# Patient Record
Sex: FEMALE | Race: BLACK OR AFRICAN AMERICAN | Employment: FULL TIME | URBAN - METROPOLITAN AREA
[De-identification: names, ages, dates, MRNs, and addresses within clinical notes are randomized per-mention and may not be internally consistent; named-entity substitution may affect disease eponyms.]

---

## 2021-02-26 PROBLEM — N94.6 DYSMENORRHEA: Status: ACTIVE | Noted: 2021-02-26

## 2021-02-26 PROBLEM — Z12.31 SCREENING MAMMOGRAM, ENCOUNTER FOR: Status: ACTIVE | Noted: 2021-02-26

## 2021-02-26 PROBLEM — Z01.419 WOMEN'S ANNUAL ROUTINE GYNECOLOGICAL EXAMINATION: Status: ACTIVE | Noted: 2021-02-26

## 2021-02-26 PROBLEM — N92.1 MENORRHAGIA WITH IRREGULAR CYCLE: Status: ACTIVE | Noted: 2021-02-26

## 2021-03-01 PROBLEM — D64.9 ANEMIA: Status: ACTIVE | Noted: 2021-03-01

## 2021-03-01 PROBLEM — R79.89 ABNORMAL TSH: Status: ACTIVE | Noted: 2021-03-01

## 2021-03-12 ENCOUNTER — HOSPITAL ENCOUNTER (OUTPATIENT)
Dept: MAMMOGRAPHY | Age: 41
Discharge: HOME | End: 2021-03-12
Attending: OBSTETRICS & GYNECOLOGY
Payer: COMMERCIAL

## 2021-03-12 DIAGNOSIS — Z12.31 SCREENING MAMMOGRAM, ENCOUNTER FOR: ICD-10-CM

## 2021-03-12 PROCEDURE — 77067 SCR MAMMO BI INCL CAD: CPT

## 2021-03-16 ENCOUNTER — HOSPITAL ENCOUNTER (OUTPATIENT)
Dept: MAMMOGRAPHY | Age: 41
Discharge: HOME | End: 2021-03-16
Attending: OBSTETRICS & GYNECOLOGY
Payer: COMMERCIAL

## 2021-03-16 DIAGNOSIS — R92.8 ABNORMAL SCREENING MAMMOGRAM: ICD-10-CM

## 2021-03-16 PROCEDURE — 76642 ULTRASOUND BREAST LIMITED: CPT

## 2021-03-16 PROCEDURE — 77065 DX MAMMO INCL CAD UNI: CPT

## 2021-03-22 PROBLEM — D50.0 IRON DEFICIENCY ANEMIA DUE TO CHRONIC BLOOD LOSS: Status: ACTIVE | Noted: 2021-03-01

## 2021-05-12 ENCOUNTER — HOSPITAL ENCOUNTER (OUTPATIENT)
Dept: SURGERY | Age: 41
Discharge: HOME | End: 2021-05-12

## 2021-05-12 VITALS — HEIGHT: 65 IN | BODY MASS INDEX: 24.99 KG/M2 | WEIGHT: 150 LBS

## 2021-05-13 NOTE — H&P
[address] [facility]  [address], [place] [address], [address]  [address]01 [facility], [place], [place] Singleton, [facility]    [facility] H&P      Assessment/Plan    Problem List  Date Reviewed: 2021          Codes Class    Iron deficiency anemia due to chronic blood loss ICD-10-CM: D50.0  ICD-9-CM: 280.0     Overview Signed 3/22/2021 10:34 AM by Adan Miranda MD     Additional Fe             Abnormal TSH ICD-10-CM: R79.89  ICD-9-CM: 790.6     Overview Signed 3/22/2021 10:34 AM by Adan Miranda MD     noted             Women's annual routine gynecological examination ICD-10-CM: Z01.419  ICD-9-CM: V72.31     Overview Signed 2021 10:51 AM by Adan Miranda MD     Pap + HPV done 21             Menorrhagia with irregular cycle ICD-10-CM: N92.1  ICD-9-CM: 626.2     Overview Addendum 3/22/2021 10:33 AM by Adan Miranda MD     US reviewed today and D/W pt in detail. She has previous failed outpatient medical management desires more definitive treatment at this time    PLAN:  Hysteroscopy D&C, Nara endometrial ablation  D/W pt at length risks/benefits of procedure including but not limited to death, bleeding, infection, perforation and damage to other internal organs. She exhibited full understanding and wishes to proceed. Dysmenorrhea ICD-10-CM: N94.6  ICD-9-CM: 625.3     Overview Signed 2021 10:54 AM by Adan Miranda MD     noted             Screening mammogram, encounter for ICD-10-CM: Z12.31  ICD-9-CM: V76.12     Overview Signed 2021 10:57 AM by Adan Miranda MD     Ordered 21                   Subjective:    Zi Mojica 39 y.o. presents today for surgical evaluation/treatment of the condition noted above. She is without any new complaints or issues.     OB History    Para Term  AB Living   1 1 0 1 0 1   SAB TAB Ectopic Molar Multiple Live Births   0 0 0 0 0 1      # Outcome Date GA Lbr Kye/2nd Weight Sex Delivery Anes PTL Lv   1   32w0d   F Vag-Spont  Y KYLE       Past Medical History:   Diagnosis Date    Abnormal Papanicolaou smear of cervix        Past Surgical History:   Procedure Laterality Date    HX CHOLECYSTECTOMY         Family History   Problem Relation Age of Onset    Ovarian Cancer Paternal Aunt     Ovarian Cancer Paternal Aunt     Ovarian Cancer Paternal Aunt     Ovarian Cancer Paternal Aunt     Breast Cancer Neg Hx     Colon Cancer Neg Hx     Uterine Cancer Neg Hx        Social History     Socioeconomic History    Marital status: SINGLE     Spouse name: Not on file    Number of children: Not on file    Years of education: Not on file    Highest education level: Not on file   Occupational History    Not on file   Social Needs    Financial resource strain: Not on file    Food insecurity     Worry: Not on file     Inability: Not on file    Transportation needs     Medical: Not on file     Non-medical: Not on file   Tobacco Use    Smoking status: Never Smoker    Smokeless tobacco: Never Used    Tobacco comment: Patient ues e-cigarette- pt denies    Substance and Sexual Activity    Alcohol use: Yes     Comment: socially     Drug use: Not Currently    Sexual activity: Yes     Birth control/protection: Condom   Lifestyle    Physical activity     Days per week: Not on file     Minutes per session: Not on file    Stress: Not on file   Relationships    Social connections     Talks on phone: Not on file     Gets together: Not on file     Attends Faith service: Not on file     Active member of club or organization: Not on file     Attends meetings of clubs or organizations: Not on file     Relationship status: Not on file    Intimate partner violence     Fear of current or ex partner: Not on file     Emotionally abused: Not on file     Physically abused: Not on file     Forced sexual activity: Not on file   Other Topics Concern    Not on file   Social History Narrative    Abuse: Feels safe at home, no history of physical abuse, no history of sexual abuse        No Known Allergies      Review of Systems    Constitutional: No fevers or chills     CV: No chest pain or palpatations    Resp: No SOB or cough    GI: No nausea/vomiting/diarrhea/constipation    Neuro: No HA, no seizure like activity    Skin: No rashes or lesions     : No dysuria or hematuria        Objective    Visit Vitals  LMP 05/02/2021         Physical Exam    Gen: alert and cooperative, NAD    HEENT: NCAT    CV: RRR    Resp: CTA bilat    Abd: soft, NT, NABS    EXT: trace edema bilat    Gyn: done as per prev office visit    Neuro: No focal deficits    Skin: No noted rashes or lesions       Vincent Cabral MD  11:47 AM  05/13/21

## 2021-05-18 ENCOUNTER — ANESTHESIA EVENT (OUTPATIENT)
Dept: SURGERY | Age: 41
End: 2021-05-18
Payer: COMMERCIAL

## 2021-05-19 ENCOUNTER — ANESTHESIA (OUTPATIENT)
Dept: SURGERY | Age: 41
End: 2021-05-19
Payer: COMMERCIAL

## 2021-05-19 ENCOUNTER — HOSPITAL ENCOUNTER (OUTPATIENT)
Age: 41
Discharge: HOME | End: 2021-05-19
Attending: OBSTETRICS & GYNECOLOGY | Admitting: OBSTETRICS & GYNECOLOGY
Payer: COMMERCIAL

## 2021-05-19 VITALS
TEMPERATURE: 98.2 F | DIASTOLIC BLOOD PRESSURE: 69 MMHG | WEIGHT: 158 LBS | BODY MASS INDEX: 26.98 KG/M2 | SYSTOLIC BLOOD PRESSURE: 121 MMHG | HEART RATE: 66 BPM | HEIGHT: 64 IN | RESPIRATION RATE: 16 BRPM | OXYGEN SATURATION: 99 %

## 2021-05-19 DIAGNOSIS — N94.6 DYSMENORRHEA: ICD-10-CM

## 2021-05-19 DIAGNOSIS — N92.1 MENORRHAGIA WITH IRREGULAR CYCLE: Primary | ICD-10-CM

## 2021-05-19 LAB — HCG UR QL: NEGATIVE

## 2021-05-19 PROCEDURE — 88305 TISSUE EXAM BY PATHOLOGIST: CPT

## 2021-05-19 PROCEDURE — 74011000250 HC RX REV CODE- 250: Performed by: NURSE ANESTHETIST, CERTIFIED REGISTERED

## 2021-05-19 PROCEDURE — 2709999900 HC NON-CHARGEABLE SUPPLY: Performed by: OBSTETRICS & GYNECOLOGY

## 2021-05-19 PROCEDURE — 77030010509 HC AIRWY LMA MSK TELE -A: Performed by: NURSE ANESTHETIST, CERTIFIED REGISTERED

## 2021-05-19 PROCEDURE — 77030005537 HC CATH URETH BARD -A: Performed by: OBSTETRICS & GYNECOLOGY

## 2021-05-19 PROCEDURE — 81025 URINE PREGNANCY TEST: CPT

## 2021-05-19 PROCEDURE — 77030034928 HC DEV UTER SURSND KT HOLO -G1: Performed by: OBSTETRICS & GYNECOLOGY

## 2021-05-19 PROCEDURE — 76210000020 HC REC RM PH II FIRST 0.5 HR: Performed by: OBSTETRICS & GYNECOLOGY

## 2021-05-19 PROCEDURE — 74011250636 HC RX REV CODE- 250/636: Performed by: NURSE ANESTHETIST, CERTIFIED REGISTERED

## 2021-05-19 PROCEDURE — 76210000006 HC OR PH I REC 0.5 TO 1 HR: Performed by: OBSTETRICS & GYNECOLOGY

## 2021-05-19 PROCEDURE — 58563 HYSTEROSCOPY ABLATION: CPT | Performed by: OBSTETRICS & GYNECOLOGY

## 2021-05-19 PROCEDURE — 77030019927 HC TBNG IRR CYSTO BAXT -A: Performed by: OBSTETRICS & GYNECOLOGY

## 2021-05-19 PROCEDURE — 76060000032 HC ANESTHESIA 0.5 TO 1 HR: Performed by: OBSTETRICS & GYNECOLOGY

## 2021-05-19 PROCEDURE — 76010000138 HC OR TIME 0.5 TO 1 HR: Performed by: OBSTETRICS & GYNECOLOGY

## 2021-05-19 PROCEDURE — 74011250636 HC RX REV CODE- 250/636: Performed by: ANESTHESIOLOGY

## 2021-05-19 PROCEDURE — 74011250637 HC RX REV CODE- 250/637: Performed by: ANESTHESIOLOGY

## 2021-05-19 RX ORDER — ONDANSETRON 2 MG/ML
INJECTION INTRAMUSCULAR; INTRAVENOUS AS NEEDED
Status: DISCONTINUED | OUTPATIENT
Start: 2021-05-19 | End: 2021-05-19 | Stop reason: HOSPADM

## 2021-05-19 RX ORDER — FENTANYL CITRATE 50 UG/ML
INJECTION, SOLUTION INTRAMUSCULAR; INTRAVENOUS AS NEEDED
Status: DISCONTINUED | OUTPATIENT
Start: 2021-05-19 | End: 2021-05-19 | Stop reason: HOSPADM

## 2021-05-19 RX ORDER — LIDOCAINE HYDROCHLORIDE 10 MG/ML
0.1 INJECTION INFILTRATION; PERINEURAL AS NEEDED
Status: DISCONTINUED | OUTPATIENT
Start: 2021-05-19 | End: 2021-05-19 | Stop reason: HOSPADM

## 2021-05-19 RX ORDER — LIDOCAINE HYDROCHLORIDE 20 MG/ML
INJECTION, SOLUTION EPIDURAL; INFILTRATION; INTRACAUDAL; PERINEURAL AS NEEDED
Status: DISCONTINUED | OUTPATIENT
Start: 2021-05-19 | End: 2021-05-19 | Stop reason: HOSPADM

## 2021-05-19 RX ORDER — FENTANYL CITRATE 50 UG/ML
100 INJECTION, SOLUTION INTRAMUSCULAR; INTRAVENOUS ONCE
Status: DISCONTINUED | OUTPATIENT
Start: 2021-05-19 | End: 2021-05-19 | Stop reason: HOSPADM

## 2021-05-19 RX ORDER — MIDAZOLAM HYDROCHLORIDE 1 MG/ML
2 INJECTION, SOLUTION INTRAMUSCULAR; INTRAVENOUS
Status: COMPLETED | OUTPATIENT
Start: 2021-05-19 | End: 2021-05-19

## 2021-05-19 RX ORDER — PROPOFOL 10 MG/ML
INJECTION, EMULSION INTRAVENOUS AS NEEDED
Status: DISCONTINUED | OUTPATIENT
Start: 2021-05-19 | End: 2021-05-19 | Stop reason: HOSPADM

## 2021-05-19 RX ORDER — SODIUM CHLORIDE 0.9 % (FLUSH) 0.9 %
5-40 SYRINGE (ML) INJECTION AS NEEDED
Status: DISCONTINUED | OUTPATIENT
Start: 2021-05-19 | End: 2021-05-19 | Stop reason: HOSPADM

## 2021-05-19 RX ORDER — IBUPROFEN 600 MG/1
600 TABLET ORAL
Qty: 60 TABLET | Refills: 0 | Status: SHIPPED | OUTPATIENT
Start: 2021-05-19

## 2021-05-19 RX ORDER — DEXTROSE, SODIUM CHLORIDE, SODIUM LACTATE, POTASSIUM CHLORIDE, AND CALCIUM CHLORIDE 5; .6; .31; .03; .02 G/100ML; G/100ML; G/100ML; G/100ML; G/100ML
150 INJECTION, SOLUTION INTRAVENOUS CONTINUOUS
Status: DISCONTINUED | OUTPATIENT
Start: 2021-05-19 | End: 2021-05-19 | Stop reason: HOSPADM

## 2021-05-19 RX ORDER — SODIUM CHLORIDE 0.9 % (FLUSH) 0.9 %
5-40 SYRINGE (ML) INJECTION EVERY 8 HOURS
Status: DISCONTINUED | OUTPATIENT
Start: 2021-05-19 | End: 2021-05-19 | Stop reason: HOSPADM

## 2021-05-19 RX ORDER — HYDROCODONE BITARTRATE AND ACETAMINOPHEN 5; 325 MG/1; MG/1
1 TABLET ORAL
Qty: 12 TABLET | Refills: 0 | Status: SHIPPED | OUTPATIENT
Start: 2021-05-19 | End: 2021-05-22

## 2021-05-19 RX ORDER — DEXAMETHASONE SODIUM PHOSPHATE 4 MG/ML
INJECTION, SOLUTION INTRA-ARTICULAR; INTRALESIONAL; INTRAMUSCULAR; INTRAVENOUS; SOFT TISSUE AS NEEDED
Status: DISCONTINUED | OUTPATIENT
Start: 2021-05-19 | End: 2021-05-19 | Stop reason: HOSPADM

## 2021-05-19 RX ORDER — SODIUM CHLORIDE, SODIUM LACTATE, POTASSIUM CHLORIDE, CALCIUM CHLORIDE 600; 310; 30; 20 MG/100ML; MG/100ML; MG/100ML; MG/100ML
75 INJECTION, SOLUTION INTRAVENOUS CONTINUOUS
Status: DISCONTINUED | OUTPATIENT
Start: 2021-05-19 | End: 2021-05-19 | Stop reason: HOSPADM

## 2021-05-19 RX ORDER — SODIUM CHLORIDE, SODIUM LACTATE, POTASSIUM CHLORIDE, CALCIUM CHLORIDE 600; 310; 30; 20 MG/100ML; MG/100ML; MG/100ML; MG/100ML
INJECTION, SOLUTION INTRAVENOUS
Status: DISCONTINUED | OUTPATIENT
Start: 2021-05-19 | End: 2021-05-19 | Stop reason: HOSPADM

## 2021-05-19 RX ORDER — KETOROLAC TROMETHAMINE 30 MG/ML
INJECTION, SOLUTION INTRAMUSCULAR; INTRAVENOUS AS NEEDED
Status: DISCONTINUED | OUTPATIENT
Start: 2021-05-19 | End: 2021-05-19 | Stop reason: HOSPADM

## 2021-05-19 RX ORDER — HYDROMORPHONE HYDROCHLORIDE 2 MG/ML
0.5 INJECTION, SOLUTION INTRAMUSCULAR; INTRAVENOUS; SUBCUTANEOUS
Status: DISCONTINUED | OUTPATIENT
Start: 2021-05-19 | End: 2021-05-19 | Stop reason: HOSPADM

## 2021-05-19 RX ORDER — HYDROCODONE BITARTRATE AND ACETAMINOPHEN 5; 325 MG/1; MG/1
2 TABLET ORAL AS NEEDED
Status: DISCONTINUED | OUTPATIENT
Start: 2021-05-19 | End: 2021-05-19 | Stop reason: HOSPADM

## 2021-05-19 RX ORDER — OXYCODONE HYDROCHLORIDE 5 MG/1
5 TABLET ORAL
Status: DISCONTINUED | OUTPATIENT
Start: 2021-05-19 | End: 2021-05-19 | Stop reason: HOSPADM

## 2021-05-19 RX ADMIN — PROPOFOL 200 MG: 10 INJECTION, EMULSION INTRAVENOUS at 10:10

## 2021-05-19 RX ADMIN — SODIUM CHLORIDE, SODIUM LACTATE, POTASSIUM CHLORIDE, AND CALCIUM CHLORIDE: 600; 310; 30; 20 INJECTION, SOLUTION INTRAVENOUS at 10:05

## 2021-05-19 RX ADMIN — KETOROLAC TROMETHAMINE 30 MG: 30 INJECTION, SOLUTION INTRAMUSCULAR; INTRAVENOUS at 10:14

## 2021-05-19 RX ADMIN — LIDOCAINE HYDROCHLORIDE 100 MG: 20 INJECTION, SOLUTION EPIDURAL; INFILTRATION; INTRACAUDAL; PERINEURAL at 10:10

## 2021-05-19 RX ADMIN — MIDAZOLAM 2 MG: 1 INJECTION INTRAMUSCULAR; INTRAVENOUS at 09:12

## 2021-05-19 RX ADMIN — FENTANYL CITRATE 50 MCG: 50 INJECTION INTRAMUSCULAR; INTRAVENOUS at 10:15

## 2021-05-19 RX ADMIN — ONDANSETRON 4 MG: 2 INJECTION INTRAMUSCULAR; INTRAVENOUS at 10:31

## 2021-05-19 RX ADMIN — SODIUM CHLORIDE, SODIUM LACTATE, POTASSIUM CHLORIDE, AND CALCIUM CHLORIDE 75 ML/HR: 600; 310; 30; 20 INJECTION, SOLUTION INTRAVENOUS at 09:06

## 2021-05-19 RX ADMIN — DEXAMETHASONE SODIUM PHOSPHATE 10 MG: 4 INJECTION, SOLUTION INTRAMUSCULAR; INTRAVENOUS at 10:14

## 2021-05-19 RX ADMIN — HYDROMORPHONE HYDROCHLORIDE 0.5 MG: 2 INJECTION, SOLUTION INTRAMUSCULAR; INTRAVENOUS; SUBCUTANEOUS at 10:47

## 2021-05-19 RX ADMIN — HYDROMORPHONE HYDROCHLORIDE 0.5 MG: 2 INJECTION, SOLUTION INTRAMUSCULAR; INTRAVENOUS; SUBCUTANEOUS at 10:53

## 2021-05-19 RX ADMIN — HYDROCODONE BITARTRATE AND ACETAMINOPHEN 2 TABLET: 5; 325 TABLET ORAL at 11:02

## 2021-05-19 NOTE — DISCHARGE INSTRUCTIONS
Patient Education        Dilation and Curettage: What to Expect at Home  Your Recovery  Dilation and curettage (D&C) is a procedure to remove tissue from the inside of the uterus. The doctor used a curved tool, called a curette, to gently scrape tissue from your uterus. You are likely to have a backache, or cramps similar to menstrual cramps, and pass small clots of blood from your vagina for the first few days. You may have light vaginal bleeding for several weeks after the procedure. You will probably be able to go back to most of your normal activities in 1 or 2 days. This care sheet gives you a general idea about how long it will take for you to recover. But each person recovers at a different pace. Follow the steps below to get better as quickly as possible. How can you care for yourself at home? Activity    · Rest when you feel tired. Getting enough sleep will help you recover.     · Most women are able to return to work the day after the procedure.     · You may have some light vaginal bleeding. Use sanitary pads until you stop bleeding. Using pads makes it easier to monitor your bleeding. Do not rinse your vagina with fluid (douche).   · Ask your doctor when it is okay for you to have sex. Diet    · You can eat your normal diet. If your stomach is upset, try bland, low-fat foods like plain rice, broiled chicken, toast, and yogurt.     · Drink plenty of fluids (unless your doctor tells you not to). Medicines    · Your doctor will tell you if and when you can restart your medicines. You will also get instructions about taking any new medicines.     · If you take aspirin or some other blood thinner, ask your doctor if and when to start taking it again. Make sure that you understand exactly what your doctor wants you to do.     · Be safe with medicines. Take pain medicines exactly as directed. ? If the doctor gave you a prescription medicine for pain, take it as prescribed.   ? If you are not taking a prescription pain medicine, ask your doctor if you can take an over-the-counter medicine.     · If you think your pain medicine is making you sick to your stomach:  ? Take your medicine after meals (unless your doctor has told you not to). ? Ask your doctor for a different pain medicine.     · If your doctor prescribed antibiotics, take them as directed. Do not stop taking them just because you feel better. You need to take the full course of antibiotics. Follow-up care is a key part of your treatment and safety. Be sure to make and go to all appointments, and call your doctor if you are having problems. It's also a good idea to know your test results and keep a list of the medicines you take. When should you call for help? Call 911 anytime you think you may need emergency care. For example, call if:    · You passed out (lost consciousness).     · You have chest pain, are short of breath, or cough up blood. Call your doctor now or seek immediate medical care if:    · You have bright red vaginal bleeding that soaks one or more pads in an hour, or you have large clots.     · You have vaginal discharge that increases in amount or smells bad.     · You are sick to your stomach or cannot drink fluids.     · You have pain that does not get better after you take pain medicine.     · You cannot pass stools or gas.     · You have symptoms of a blood clot in your leg (called a deep vein thrombosis), such as:  ? Pain in your calf, back of the knee, thigh, or groin. ? Redness and swelling in your leg.     · You have signs of infection, such as:  ? Increased pain, swelling, warmth, or redness. ? Red streaks leading from the area. ? Pus draining from the area. ? A fever. Watch closely for changes in your health, and be sure to contact your doctor if you have any problems. Where can you learn more?   Go to http://www."[employer]".com/  Enter D453 in the search box to learn more about \"Dilation and Curettage: What to Expect at Home. \"  Current as of: October 8, 2020               Content Version: 12.8  © 2006-2021 [employer]. Care instructions adapted under license by [employer] (which disclaims liability or warranty for this information). If you have questions about a medical condition or this instruction, always ask your healthcare professional. [address] any warranty or liability for your use of this information.

## 2021-05-19 NOTE — INTERVAL H&P NOTE
I have examined and spoken with the patient this morning. She has no new medical issues or complaints. She reports no new medicines since H&P. She again understands procedure, risks/benefits and agrees to proceed. Emi Land MD 
8:47 AM 
05/19/21

## 2021-05-19 NOTE — ANESTHESIA POSTPROCEDURE EVALUATION
Procedure(s):  DILATATION AND CURETTAGE HYSTEROSCOPY ENDOMETRIAL ABLATION/NOVASURE.     general    Anesthesia Post Evaluation      Multimodal analgesia: multimodal analgesia used between 6 hours prior to anesthesia start to PACU discharge  Patient location during evaluation: PACU  Patient participation: complete - patient participated  Level of consciousness: awake and alert  Pain score: 0  Pain management: adequate  Airway patency: patent  Anesthetic complications: no  Cardiovascular status: acceptable and hemodynamically stable  Respiratory status: acceptable and spontaneous ventilation  Hydration status: acceptable  Post anesthesia nausea and vomiting:  none  Final Post Anesthesia Temperature Assessment:  Normothermia (36.0-37.5 degrees C)      INITIAL Post-op Vital signs:   Vitals Value Taken Time   /67 05/19/21 1105   Temp 36.8 °C (98.2 °F) 05/19/21 1039   Pulse 72 05/19/21 1105   Resp 16 05/19/21 1105   SpO2 98 % 05/19/21 1105

## 2021-05-19 NOTE — OP NOTES
[place] [address], [address]  [address] [facility], [place], Simeon Mensah, [facility]    Geno Browning  1980      Preop Dx:   1. Menorrhagia with irreg cycle    2. Dysmenorrhea    Postop Dx:   Same    Procedure:   1. Hysteroscopy D&C    2. Novasure endometrial ablation    Surgeon:  Kristel Levy      Anesthesia:  LMA general    EBL:  5 mL     IVF:  027 mL       Complications:  None    Findings:  Uniformly thickened EMS. No masses or polyps appreciated. Nml ostia bilaterally. Procedure:  Patient was taken to the operating room where general anesthesia was found to be adequate. She was prepped and draped in the usual sterile fashion and placed in the lithotomy position in [employer] Weighted speculum was placed in patient's vagina and anterior lip of cervix grasped with a single tooth tenaculum. Uterus was sounded to 10 cm. Cervix was sequentially dilated with Hegar dilators to a #6. Hysteroscope was introduced without difficulty and findings as above. Hysteroscope removed and sharp curettage was undertaken until the uterus had a gritty texture throughout. Cervix then dilated to an #8. Novasure introduced and ablation performed without difficulty in usual fashion. Cavity length -- 6.0 cm  Cavity width -- 3.6 cm  Trivedi -- 119  Treatment time -- 1 min, 28 sec  Hysteroscope re-introduced and cautery effect found throughout the endometrial cavity. All instruments removed from the patient's vagina and hemostasis was assured throughout. Patient tolerated procedure well. Sponge, lap and needle counts correct x 3.     Disposition:  Pt to RR in stable condition    Pathology: endometrial curetings      Jana Olivarez MD   10:34 AM  05/19/21

## 2021-06-01 PROBLEM — Z09 POSTOPERATIVE EXAMINATION: Status: ACTIVE | Noted: 2021-06-01

## 2021-08-11 ENCOUNTER — HOSPITAL ENCOUNTER (EMERGENCY)
Age: 41
Discharge: HOME | End: 2021-08-11
Attending: EMERGENCY MEDICINE
Payer: COMMERCIAL

## 2021-08-11 VITALS
BODY MASS INDEX: 26.63 KG/M2 | DIASTOLIC BLOOD PRESSURE: 65 MMHG | TEMPERATURE: 98.4 F | HEART RATE: 80 BPM | RESPIRATION RATE: 17 BRPM | SYSTOLIC BLOOD PRESSURE: 108 MMHG | WEIGHT: 160 LBS | OXYGEN SATURATION: 100 %

## 2021-08-11 DIAGNOSIS — J02.9 SORE THROAT: Primary | ICD-10-CM

## 2021-08-11 LAB — STREP,MOLECULAR STRPM: NOT DETECTED

## 2021-08-11 PROCEDURE — 99283 EMERGENCY DEPT VISIT LOW MDM: CPT

## 2021-08-11 PROCEDURE — 74011000250 HC RX REV CODE- 250: Performed by: EMERGENCY MEDICINE

## 2021-08-11 PROCEDURE — 74011250637 HC RX REV CODE- 250/637: Performed by: EMERGENCY MEDICINE

## 2021-08-11 PROCEDURE — 87651 STREP A DNA AMP PROBE: CPT

## 2021-08-11 RX ORDER — DEXAMETHASONE SODIUM PHOSPHATE 100 MG/10ML
10 INJECTION INTRAMUSCULAR; INTRAVENOUS
Status: COMPLETED | OUTPATIENT
Start: 2021-08-11 | End: 2021-08-11

## 2021-08-11 RX ORDER — LIDOCAINE HYDROCHLORIDE 20 MG/ML
15 SOLUTION OROPHARYNGEAL
Status: COMPLETED | OUTPATIENT
Start: 2021-08-11 | End: 2021-08-11

## 2021-08-11 RX ADMIN — LIDOCAINE HYDROCHLORIDE 15 ML: 20 SOLUTION ORAL at 23:14

## 2021-08-11 RX ADMIN — DEXAMETHASONE SODIUM PHOSPHATE 10 MG: 10 INJECTION INTRAMUSCULAR; INTRAVENOUS at 23:14

## 2021-08-12 NOTE — ED NOTES
I have reviewed discharge instructions with the patient. The patient verbalized understanding. Patient left ED via Discharge Method: ambulatory to Home with self. Opportunity for questions and clarification provided. Patient given 0 scripts. To continue your aftercare when you leave the hospital, you may receive an automated call from our care team to check in on how you are doing. This is a free service and part of our promise to provide the best care and service to meet your aftercare needs.  If you have questions, or wish to unsubscribe from this service please call [phone]. Thank you for Choosing our [address] [place] [address] Emergency Department.

## 2021-08-12 NOTE — ED PROVIDER NOTES
Santosh Morrissey is a 39 y.o. female seen on 8/11/2021 in the [facility] EMERGENCY DEPT in room NÚÑEZ/A. Chief Complaint   Patient presents with    Sore Throat     HPI: 41-year-old -American female presented emergency department with complaints of sore throat for the past 3 days. Patient has not vaccinated for COVID-19. She denies any possible exposures that she is aware of. She does not want be tested for COVID-19. Patient states that it hurts to swallow. She has had a mild cough. She denies any fevers, headaches, nausea, vomiting, chills, changes in bowel or bladder habits or any other concerns. Patient had a rapid strep test performed in triage that was negative. She has other complaints at this time    Historian: Patient    REVIEW OF SYSTEMS     Review of Systems   Constitutional: Negative. HENT: Positive for sore throat. Respiratory: Positive for cough. Cardiovascular: Negative. Gastrointestinal: Negative. Genitourinary: Negative. Musculoskeletal: Negative. Skin: Negative. Neurological: Negative. Psychiatric/Behavioral: Negative. All other systems reviewed and are negative.       PAST MEDICAL HISTORY     Past Medical History:   Diagnosis Date    Abnormal Papanicolaou smear of cervix      Past Surgical History:   Procedure Laterality Date    HX CHOLECYSTECTOMY      HX DILATION AND CURETTAGE  05/19/2021    with endometrial ablation     Social History     Socioeconomic History    Marital status: SINGLE     Spouse name: Not on file    Number of children: Not on file    Years of education: Not on file    Highest education level: Not on file   Tobacco Use    Smoking status: Never Smoker    Smokeless tobacco: Never Used    Tobacco comment: Patient ues e-cigarette- pt denies    Vaping Use    Vaping Use: Every day    Substances: Nicotine, Flavoring   Substance and Sexual Activity    Alcohol use: Yes     Comment: socially     Drug use: Not Currently    Sexual activity: Yes     Birth control/protection: Condom   Social History Narrative    Abuse: Feels safe at home, no history of physical abuse, no history of sexual abuse      Social Determinants of Health     Financial Resource Strain:     Difficulty of Paying Living Expenses:    Food Insecurity:     Worried About Running Out of Food in the Last Year:     [address] in the Last Year:    Transportation Needs:     Lack of Transportation (Medical):  Lack of Transportation (Non-Medical):    Physical Activity:     Days of Exercise per Week:     Minutes of Exercise per Session:    Stress:     Feeling of Stress :    Social Connections:     Frequency of Communication with Friends and Family:     Frequency of Social Gatherings with Friends and Family:     Attends Jainism Services:     Active Member of Clubs or Organizations:     Attends Club or Organization Meetings:     Marital Status:      Prior to Admission Medications   Prescriptions Last Dose Informant Patient Reported? Taking?   ibuprofen (MOTRIN) 600 mg tablet   No No   Sig: Take 1 Tablet by mouth every six (6) hours as needed for Pain. Facility-Administered Medications: None     No Known Allergies     PHYSICAL EXAM       Vitals:    08/11/21 2027   BP: 118/69   Pulse: 81   Resp: 18   Temp: 98.4 °F (36.9 °C)   SpO2: 99%    Vital signs were reviewed. Physical Exam  Vitals and nursing note reviewed. Constitutional:       General: She is not in acute distress. Appearance: She is well-developed. She is not ill-appearing or toxic-appearing. HENT:      Head: Normocephalic and atraumatic. Nose: No congestion. Mouth/Throat:      Mouth: Mucous membranes are moist.      Pharynx: No pharyngeal swelling, oropharyngeal exudate, posterior oropharyngeal erythema or uvula swelling. Tonsils: No tonsillar exudate.       Comments: Mild cobblestoning of the posterior pharynx without erythema or swelling  Cardiovascular:      Rate and Rhythm: Normal rate and regular rhythm. Pulmonary:      Effort: Pulmonary effort is normal.   Abdominal:      Palpations: Abdomen is soft. Tenderness: There is no abdominal tenderness. There is no rebound. Skin:     General: Skin is warm and dry. Neurological:      General: No focal deficit present. Mental Status: She is alert. Psychiatric:         Mood and Affect: Mood normal.         Behavior: Behavior normal.          MEDICAL DECISION MAKING     ED Course:    Orders Placed This Encounter    STREP, GROUP A, CARYN    dexamethasone (DECADRON) 10 mg/mL Oral 10 mg    lidocaine (XYLOCAINE) 2 % viscous solution 15 mL     Recent Results (from the past 8 hour(s))   STREP, GROUP A, CARYN    Collection Time: 08/11/21 10:18 PM    Specimen: Throat   Result Value Ref Range    Strep, Molecular Not detected       No results found. MDM  Number of Diagnoses or Management Options  Diagnosis management comments: 79-year-old female presented to emergency department with sore throat x3 days. Patient with no erythema or exudate of posterior pharynx. Patient with cobblestoning of posterior pharynx. Patient given 1 dose of oral Decadron with viscous lidocaine in the emergency department. She will be discharged home and return to emergency department for any concerns. Amount and/or Complexity of Data Reviewed  Clinical lab tests: reviewed    Patient Progress  Patient progress: stable        Disposition: Discharged  Diagnosis:     ICD-10-CM ICD-9-CM   1. Sore throat  J02.9 462     ____________________________________________________________________  A portion of this note was generated using voice recognition dictation software. While the note has been reviewed for accuracy, please note certain words and phrases may not be transcribed as intended and some grammatical and/or typographical errors may be present.

## 2022-03-18 PROBLEM — R79.89 ABNORMAL TSH: Status: ACTIVE | Noted: 2021-03-01

## 2022-03-18 PROBLEM — D50.0 IRON DEFICIENCY ANEMIA DUE TO CHRONIC BLOOD LOSS: Status: ACTIVE | Noted: 2021-03-01

## 2022-03-18 PROBLEM — N94.6 DYSMENORRHEA: Status: ACTIVE | Noted: 2021-02-26

## 2022-03-18 PROBLEM — Z01.419 WOMEN'S ANNUAL ROUTINE GYNECOLOGICAL EXAMINATION: Status: ACTIVE | Noted: 2021-02-26

## 2022-03-19 PROBLEM — Z12.31 SCREENING MAMMOGRAM, ENCOUNTER FOR: Status: ACTIVE | Noted: 2021-02-26

## 2022-03-19 PROBLEM — N92.1 MENORRHAGIA WITH IRREGULAR CYCLE: Status: ACTIVE | Noted: 2021-02-26

## 2022-03-19 PROBLEM — Z09 POSTOPERATIVE EXAMINATION: Status: ACTIVE | Noted: 2021-06-01

## 2023-06-23 ENCOUNTER — CLINICAL DOCUMENTATION (OUTPATIENT)
Dept: ORTHOPEDIC SURGERY | Age: 43
End: 2023-06-23

## 2023-06-28 ENCOUNTER — CLINICAL DOCUMENTATION (OUTPATIENT)
Dept: ORTHOPEDIC SURGERY | Age: 43
End: 2023-06-28

## 2023-06-29 ENCOUNTER — OFFICE VISIT (OUTPATIENT)
Dept: ORTHOPEDIC SURGERY | Age: 43
End: 2023-06-29

## 2023-06-29 VITALS — BODY MASS INDEX: 28.17 KG/M2 | HEIGHT: 64 IN | WEIGHT: 165 LBS

## 2023-06-29 DIAGNOSIS — S92.001G CLOSED NONDISPLACED FRACTURE OF RIGHT CALCANEUS WITH DELAYED HEALING, UNSPECIFIED PORTION OF CALCANEUS, SUBSEQUENT ENCOUNTER: Primary | ICD-10-CM

## 2023-06-29 DIAGNOSIS — S92.214G CLOSED NONDISPLACED FRACTURE OF CUBOID OF RIGHT FOOT WITH DELAYED HEALING, SUBSEQUENT ENCOUNTER: ICD-10-CM

## 2023-07-03 ENCOUNTER — CLINICAL DOCUMENTATION (OUTPATIENT)
Dept: ORTHOPEDIC SURGERY | Age: 43
End: 2023-07-03

## 2023-07-12 ENCOUNTER — OFFICE VISIT (OUTPATIENT)
Dept: ORTHOPEDIC SURGERY | Age: 43
End: 2023-07-12

## 2023-07-12 DIAGNOSIS — S92.024A CLOSED NONDISPLACED FRACTURE OF ANTERIOR PROCESS OF RIGHT CALCANEUS, INITIAL ENCOUNTER: Primary | ICD-10-CM

## 2023-07-12 RX ORDER — CYCLOBENZAPRINE HCL 5 MG
5 TABLET ORAL NIGHTLY PRN
COMMUNITY
Start: 2021-10-13

## 2023-07-12 NOTE — PROGRESS NOTES
Name: Brittany Arevalo  YOB: 1980  Gender: female  MRN: [id number]    Summary:     Right anterior process calcaneus fracture     CC: New Patient (MET referral MRI results right calcaneus)       HPI: Brittany Arevalo is a 37 y.o. female who presents with New Patient (MET referral MRI results right calcaneus)  . Presents the office today 4 weeks out from an injury to her right foot. She is been treated by Dr. Arely Mahmood for this. She is wearing a walker boot and has had an MRI and is here today to follow-up on the results of the MRI that Dr. Arely Mahmood ordered. She states to me today that she definitely does not want to have surgery on this unless she absolutely has to have surgery. History was obtained by Patient     ROS/Meds/PSH/PMH/FH/SH: I personally reviewed the patients standard intake form. Below are the pertinents    Tobacco:  reports that she has never smoked. She has never used smokeless tobacco.  Diabetes: None      Physical Examination:    Exam of the right foot shows tenderness to palpation at the anterior process of the calcaneus. Imaging:   I independently interpreted an MRI ordered by a different physician, taken from an outside facility of the right foot which shows a very small nondisplaced anterior process calcaneus fracture with associated edema           Liang Cifuentes III, MD           Assessment:   Small anterior process calcaneus fracture    Treatment Plan:   4 This is a chronic illness/condition with exacerbation and progression  Treatment at this time: Time with no intervention  Studies ordered: Foot XR needed @ Next Visit    Weight-bearing status: WBAT        Return to work/work restrictions: No work until next follow up  No medications given    The fracture is very small. She is relieved to know that she probably does not need an operation on this.   I did tell there is a small chance that she has persistent pain down the road that we would excise the

## 2023-07-21 ENCOUNTER — TELEPHONE (OUTPATIENT)
Dept: ORTHOPEDIC SURGERY | Age: 43
End: 2023-07-21

## 2023-07-21 NOTE — TELEPHONE ENCOUNTER
Called and spoke to pt , will f/u with pt on Monday once I know what doctor she needs to follow up with.

## 2023-07-28 ENCOUNTER — CLINICAL DOCUMENTATION (OUTPATIENT)
Dept: ORTHOPEDIC SURGERY | Age: 43
End: 2023-07-28

## 2023-08-09 ENCOUNTER — CLINICAL DOCUMENTATION (OUTPATIENT)
Dept: ORTHOPEDIC SURGERY | Age: 43
End: 2023-08-09

## 2023-08-16 ENCOUNTER — CLINICAL DOCUMENTATION (OUTPATIENT)
Dept: ORTHOPEDIC SURGERY | Age: 43
End: 2023-08-16

## 2023-08-23 ENCOUNTER — OFFICE VISIT (OUTPATIENT)
Dept: ORTHOPEDIC SURGERY | Age: 43
End: 2023-08-23
Payer: COMMERCIAL

## 2023-08-23 DIAGNOSIS — S92.024A CLOSED NONDISPLACED FRACTURE OF ANTERIOR PROCESS OF RIGHT CALCANEUS, INITIAL ENCOUNTER: Primary | ICD-10-CM

## 2023-08-23 PROCEDURE — 99214 OFFICE O/P EST MOD 30 MIN: CPT | Performed by: ORTHOPAEDIC SURGERY

## 2023-08-23 RX ORDER — IBUPROFEN 800 MG/1
800 TABLET ORAL EVERY 8 HOURS PRN
Qty: 90 TABLET | Refills: 1 | Status: SHIPPED | OUTPATIENT
Start: 2023-08-23

## 2023-08-23 NOTE — PROGRESS NOTES
Name: Yannick Landry  YOB: 1980  Gender: female  MRN: [id number]    Summary:   Right anterior process calcaneus fracture       CC: Foot Pain (Follow up right pierre fx )       HPI: Yannick Landry is a 37 y.o. female who presents with Foot Pain (Follow up right pierre fx )  . This patient presents back to office today for follow-up of right anterior process calcaneus fracture. She is making excellent clinical progress. History was obtained by Patient     ROS/Meds/PSH/PMH/FH/SH: I personally reviewed the patients standard intake form. Below are the pertinents    Tobacco:  reports that she has never smoked. She has never used smokeless tobacco.  Diabetes: None      Physical Examination:  Mikey of the right foot and ankle shows very little swelling and tenderness palpation over the lateral aspect of the foot. Imaging:   I independently interpreted XR taken today  Foot XR: AP, Lateral, Oblique views     ICD-10-CM    1. Closed nondisplaced fracture of anterior process of right calcaneus, initial encounter  S92.024A XR FOOT RIGHT (MIN 3 VIEWS)         Report: AP, lateral, oblique x-ray of the right foot demonstrates calcaneus fracture    Impression:  Calcaneus fracture   Jackie Reno III, MD           Assessment:   Right healing anterior process calcaneus fracture    Treatment Plan:   4 This is a chronic illness/condition with exacerbation and progression  Treatment at this time: Prescription Drug Management  Studies ordered: NO XR needed @ Next Visit    Weight-bearing status: WBAT        Return to work/work restrictions: none  Ibuprofen 800 mg 3 times daily as needed prescribed with GI precautions and instructions not to take other anti-inflammatories with this. Start the home exercise program provided today and return as needed.

## 2023-08-30 ENCOUNTER — CLINICAL DOCUMENTATION (OUTPATIENT)
Dept: ORTHOPEDIC SURGERY | Age: 43
End: 2023-08-30

## 2023-10-23 RX ORDER — IBUPROFEN 800 MG/1
800 TABLET ORAL EVERY 8 HOURS PRN
Qty: 90 TABLET | Refills: 1 | Status: SHIPPED | OUTPATIENT
Start: 2023-10-23

## 2023-12-22 RX ORDER — IBUPROFEN 800 MG/1
800 TABLET ORAL EVERY 8 HOURS PRN
Qty: 90 TABLET | Refills: 1 | OUTPATIENT
Start: 2023-12-22

## 2025-03-17 ENCOUNTER — OFFICE VISIT (OUTPATIENT)
Dept: ORTHOPEDIC SURGERY | Age: 45
End: 2025-03-17
Payer: COMMERCIAL

## 2025-03-17 VITALS — HEIGHT: 65 IN | BODY MASS INDEX: 29.99 KG/M2 | WEIGHT: 180 LBS

## 2025-03-17 DIAGNOSIS — M25.512 LEFT SHOULDER PAIN, UNSPECIFIED CHRONICITY: ICD-10-CM

## 2025-03-17 DIAGNOSIS — S43.432A SUPERIOR GLENOID LABRUM LESION OF LEFT SHOULDER, INITIAL ENCOUNTER: Primary | ICD-10-CM

## 2025-03-17 DIAGNOSIS — M75.112 INCOMPLETE TEAR OF LEFT ROTATOR CUFF, UNSPECIFIED WHETHER TRAUMATIC: ICD-10-CM

## 2025-03-17 PROCEDURE — 99205 OFFICE O/P NEW HI 60 MIN: CPT | Performed by: ORTHOPAEDIC SURGERY

## 2025-03-17 RX ORDER — IBUPROFEN 800 MG/1
800 TABLET, FILM COATED ORAL EVERY 8 HOURS PRN
Qty: 60 TABLET | Refills: 1 | Status: SHIPPED | OUTPATIENT
Start: 2025-03-17

## 2025-03-17 RX ORDER — IBUPROFEN 600 MG/1
600 TABLET, FILM COATED ORAL 3 TIMES DAILY PRN
Qty: 30 TABLET | Refills: 0 | Status: CANCELLED | OUTPATIENT
Start: 2025-03-17

## 2025-03-17 SDOH — HEALTH STABILITY: PHYSICAL HEALTH: ON AVERAGE, HOW MANY MINUTES DO YOU ENGAGE IN EXERCISE AT THIS LEVEL?: 30 MIN

## 2025-03-17 SDOH — HEALTH STABILITY: PHYSICAL HEALTH: ON AVERAGE, HOW MANY DAYS PER WEEK DO YOU ENGAGE IN MODERATE TO STRENUOUS EXERCISE (LIKE A BRISK WALK)?: 3 DAYS

## 2025-03-17 NOTE — PROGRESS NOTES
tearing of the supraspinatus as well.  All imaging interpreted by myself Hayder Castorena MD independent of radiology review        Assessment:     ICD-10-CM    1. Superior glenoid labrum lesion of left shoulder, initial encounter  S43.432A       2. Left shoulder pain, unspecified chronicity  M25.512 XR SHOULDER LEFT (MIN 2 VIEWS)     ibuprofen (ADVIL;MOTRIN) 800 MG tablet     Ambulatory referral to Orthopedic Surgery     Ambulatory Referral to DME      3. Incomplete tear of left rotator cuff, unspecified whether traumatic  M75.112           Plan:   Assessment & Plan  1. SLAP tear.  The patient has a superior labrum anterior to posterior (SLAP) tear in the shoulder, likely caused by a work-related injury in November. Se has undergone physical therapy with some improvement but continues to experience significant pain and limited range of motion. Surgical intervention is recommended, including shoulder arthroscopy with labrum repair, biceps tenodesis, and subacromial decompression.    2. Partial rotator cuff tear.  A partial rotator cuff tear is also present, which may be due to wear and tear or could be more substantial than indicated by the MRI. The surgical plan includes a possible rotator cuff repair, depending on intraoperative findings.    Of note she is scheduled for a GYN biopsy in early May and a hysterectomy in the first week of June show she would like to get her shoulder surgery done prior to that so that we can coordinate her rehab appropriately and hopefully she will be coming out of the sling around that time.    We will see her back for preoperative appointment the surgical plan to be for left shoulder arthroscopy, labral repair, subacromial decompression possible rotator cuff repair and open biceps tenodesis   The patient (or guardian, if applicable) and other individuals in attendance with the patient were advised that Artificial Intelligence will be utilized during this visit to record, process the

## 2025-04-07 DIAGNOSIS — M25.512 LEFT SHOULDER PAIN, UNSPECIFIED CHRONICITY: ICD-10-CM

## 2025-04-07 DIAGNOSIS — M75.112 INCOMPLETE TEAR OF LEFT ROTATOR CUFF, UNSPECIFIED WHETHER TRAUMATIC: ICD-10-CM

## 2025-04-07 DIAGNOSIS — S43.432A SUPERIOR GLENOID LABRUM LESION OF LEFT SHOULDER, INITIAL ENCOUNTER: Primary | ICD-10-CM

## 2025-04-14 ENCOUNTER — OFFICE VISIT (OUTPATIENT)
Dept: ORTHOPEDIC SURGERY | Age: 45
End: 2025-04-14

## 2025-04-14 DIAGNOSIS — M25.512 LEFT SHOULDER PAIN, UNSPECIFIED CHRONICITY: ICD-10-CM

## 2025-04-14 DIAGNOSIS — S43.432A SUPERIOR GLENOID LABRUM LESION OF LEFT SHOULDER, INITIAL ENCOUNTER: ICD-10-CM

## 2025-04-14 DIAGNOSIS — M25.562 LEFT KNEE PAIN, UNSPECIFIED CHRONICITY: ICD-10-CM

## 2025-04-14 DIAGNOSIS — M75.112 INCOMPLETE TEAR OF LEFT ROTATOR CUFF, UNSPECIFIED WHETHER TRAUMATIC: ICD-10-CM

## 2025-04-14 DIAGNOSIS — Z01.818 PREOP EXAMINATION: Primary | ICD-10-CM

## 2025-04-14 RX ORDER — ONDANSETRON 4 MG/1
4 TABLET, ORALLY DISINTEGRATING ORAL EVERY 6 HOURS
Qty: 20 TABLET | Refills: 0 | Status: SHIPPED | OUTPATIENT
Start: 2025-04-14

## 2025-04-14 RX ORDER — OXYCODONE HYDROCHLORIDE 5 MG/1
5 TABLET ORAL EVERY 4 HOURS PRN
Qty: 30 TABLET | Refills: 0 | Status: SHIPPED | OUTPATIENT
Start: 2025-04-14 | End: 2025-04-19

## 2025-04-14 NOTE — PROGRESS NOTES
Patient was fitted and instructed on the use of a size M Ultrasling for the patient's left shoulder.   I provided the following instructions along with proper fitting as noted below.   Fitting instructions included   How to adjusting the thumb support and avoiding irritation to hand. Patient was instructed this should not be used until the block given at surgery has worn off and patient has regained feeling in hand.   How to manage the quick release connection.   The shoulder straps are adjusted to insure correct fit including trimming any excess material.   The shoulder strap crosses over the opposite shoulder being sure to avoid excess pull on neck  Placement of pillow placed at the waist line of the affected shoulder with the Velcro facing away from body allowing for sling attachment.   Positioning the elbow in sling as far back as possible providing adequate support  Keeping the forearm close to the body preventing excessive ER   Keeping the hand higher than the elbow to allow for adequate support of arm in sling and avoiding excess swelling to hand.   How to detach the shoulder strap patsy and open front panel to allow for proper hygiene.  Patient was informed waist belt should stay in place at all times unless told otherwise by the Doctor or Physical Therapist.   Patient was also made aware to bring an oversized shirt to surgery to provide coverage and comfort when leaving the hospital.   The patient was instructed to bring ultrasling, oversized shirt, and iceman pad (if purchased or prescribed) with them on the day of surgery.   Patient was fitted and instructed on the use of an Ultrasling for the patients left shoulder. I fitted patient with a size M ultrasling. Patient was instructed to position elbow in sling as far back as possible and that the arm should be internally rotated lying nicely against abdomen. I demonstrated how the shoulder strap crosses over the opposite shoulder and connects to the quick 
and empty can and external rotation position 5 out of 5 resisted internal rotation strength motor and sensory intact.       Assessment:     ICD-10-CM    1. Preop examination  Z01.818       2. Left shoulder pain, unspecified chronicity  M25.512 [place] [facility]     Ambulatory referral to Physical Therapy      3. Superior glenoid labrum lesion of left shoulder, initial encounter  S43.432A Amb[place] [facility]     Ambulatory referral to Physical Therapy      4. Incomplete tear of left rotator cuff, unspecified whether traumatic  M75.112 Amb[place] Ref[place] [facility]     Ambulatory referral to Physical Therapy        Surgical Plan:    Left shoulder arthroscopy, labral repair, subacromial decompression, possible rotator cuff repair and open biceps tenodesis.    Oxycodone and zofran sent to pharmacy.  She understands to bring UltraSling and iceman pad to surgery.    We did discuss the details risks and benefits of shoulder arthroscopic rotator cuff repair with subacromial decompression and likely biceps tenotomy versus tenodesis including but not limited to anesthetic complications postoperative numbness stiffness, nerve block complications, postoperative stiffness, infection, need for further surgery, and the extensive rehab course associated with the procedure as well as likely a 6 to 12-month full recovery from the procedure.  All of their questions have been answered and the patient elects to proceed as planned    Olga Buckley PA-C  Orthopaedics and Sports Medicine

## 2025-04-17 DIAGNOSIS — M75.112 INCOMPLETE TEAR OF LEFT ROTATOR CUFF, UNSPECIFIED WHETHER TRAUMATIC: ICD-10-CM

## 2025-04-17 DIAGNOSIS — M25.512 LEFT SHOULDER PAIN, UNSPECIFIED CHRONICITY: Primary | ICD-10-CM

## 2025-04-17 DIAGNOSIS — S43.432A SUPERIOR GLENOID LABRUM LESION OF LEFT SHOULDER, INITIAL ENCOUNTER: ICD-10-CM

## 2025-04-23 ENCOUNTER — OUTSIDE SERVICES (OUTPATIENT)
Dept: ORTHOPEDIC SURGERY | Age: 45
End: 2025-04-23

## 2025-04-23 DIAGNOSIS — M75.42 IMPINGEMENT SYNDROME OF LEFT SHOULDER: ICD-10-CM

## 2025-04-23 DIAGNOSIS — M75.112 INCOMPLETE TEAR OF LEFT ROTATOR CUFF, UNSPECIFIED WHETHER TRAUMATIC: ICD-10-CM

## 2025-04-23 DIAGNOSIS — S43.432A SUPERIOR GLENOID LABRUM LESION OF LEFT SHOULDER, INITIAL ENCOUNTER: Primary | ICD-10-CM

## 2025-04-23 DIAGNOSIS — M75.22 BICEPS TENDINITIS OF LEFT SHOULDER: ICD-10-CM

## 2025-04-23 NOTE — OP NOTE
Operative Report    Patient: Jamaica Rodriguez MRN: [id number]  SSN: [id number]    YOB: 1980  Age: 45 y.o.  Sex: female       Date of Surgery: 4/23/25    Preoperative Diagnosis:   1left Shoulder biceps tendinitis/superior labral tear  2)left Shoulder outlet impingement  3) left shoulder superior labral tear      Postoperative Diagnosis: Same as above     Surgeon(s) and Role:     * Hayder Castorena MD - Primary    Assistant: Olga Buckley PA-C   The complexity of the surgery requires the use of a first assistant for retraction, fixation of the tendon, and assistance in closure.  MALIKA Zurita was this assistant and was there for the entirety of the case.     Anesthesia: General + ISB block    Procedure: 1)  left  shoulder subpectoral biceps tenodesis   2) left  Shoulder Arthroscopy with debridement of rotator cuff, labrum, glenohumeral capsule  3) left  Shoulder Arthroscopy with subacromial decompression        Estimated Blood Loss:  25 mL      Implants: Arthrex proximal biceps fiber tack button           Specimens: none      Drains: None                Complications: None    Counts: Sponge and needle counts were correct times two.    Procedure in Detail: After informed consent was obtained the surgical site was marked in the preoperative area by myself and the patient was brought to the operating room and general anesthesia was induced.  The patient was positioned in the beachchair position with all bony prominences well-padded and the operative shoulder was prepped and draped in the usual orthopedic sterile fashion.  Timeout was performed per protocol antibiotics given per protocol.     Initially a standard posterior lateral arthroscopy viewing portal was established.  Diagnostic arthroscopy was carried out.  Anterior interval portal was established under spinal needle guidance.There was overall maintenance of the articular surface of the humeral head and glenoid.  The subscapularis was

## 2025-04-24 ENCOUNTER — PROCEDURE VISIT (OUTPATIENT)
Dept: ORTHOPEDIC SURGERY | Age: 45
End: 2025-04-24

## 2025-04-24 DIAGNOSIS — M75.42 SHOULDER IMPINGEMENT SYNDROME, LEFT: ICD-10-CM

## 2025-04-24 DIAGNOSIS — S43.432D SUPERIOR GLENOID LABRUM LESION OF LEFT SHOULDER, SUBSEQUENT ENCOUNTER: ICD-10-CM

## 2025-04-24 DIAGNOSIS — M75.22 BICEPS TENDINITIS OF LEFT SHOULDER: Primary | ICD-10-CM

## 2025-04-24 NOTE — PROGRESS NOTES
Assistant: Olga Buckley PA-C   The complexity of the surgery requires the use of a first assistant for retraction, fixation of the tendon, and assistance in closure.  MALIKA Zurita was this assistant and was there for the entirety of the case

## 2025-04-25 ENCOUNTER — HOSPITAL ENCOUNTER (OUTPATIENT)
Dept: PHYSICAL THERAPY | Age: 45
Setting detail: RECURRING SERIES
Discharge: HOME | End: 2025-04-28
Payer: COMMERCIAL

## 2025-04-25 DIAGNOSIS — M25.512 LEFT SHOULDER PAIN, UNSPECIFIED CHRONICITY: Primary | ICD-10-CM

## 2025-04-25 DIAGNOSIS — S46.112S LABRAL TEAR OF LONG HEAD OF LEFT BICEPS TENDON, SEQUELA: ICD-10-CM

## 2025-04-25 DIAGNOSIS — Z47.89 ORTHOPEDIC AFTERCARE: ICD-10-CM

## 2025-04-25 PROCEDURE — 97110 THERAPEUTIC EXERCISES: CPT

## 2025-04-25 PROCEDURE — 97161 PT EVAL LOW COMPLEX 20 MIN: CPT

## 2025-04-25 NOTE — THERAPY EVALUATION
Jamaica Rodriguez  : 1980  Primary: Generic Self-insured Wc (Worker's Comp)  Secondary:  [facility]  [address] [place] [address]  Phone: [phone]  Fax: [phone] Plan Frequency: 2x/week for 90 days    Plan of Care/Certification Expiration Date: 25        Plan of Care/Certification Expiration Date:  Plan of Care/Certification Expiration Date: 25    Frequency/Duration: Plan Frequency: 2x/week for 90 days      Time In/Out:          PT Visit Info:         Visit Count:  1                OUTPATIENT PHYSICAL THERAPY:             Initial Assessment 2025               Episode (s/p L Bicep Tenodesis)         Treatment Diagnosis:     Left shoulder pain, unspecified chronicity  Orthopedic aftercare  Labral tear of long head of left biceps tendon, sequela  Medical/Referring Diagnosis:    Left shoulder pain, unspecified chronicity  Superior glenoid labrum lesion of left shoulder, initial encounter  Incomplete tear of left rotator cuff, unspecified whether traumatic      Referring Physician:  Olga Buckley PA MD Orders:  PT Eval and Treat   Return MD Appt:    Date of Onset:    DoS: 25  Allergies:  Patient has no known allergies.  Restrictions/Precautions:    Post Surgical Precautions: Biceps Tenodesis Protocol      Medications Last Reviewed: 2025     SUBJECTIVE   History of Injury/Illness (Reason for Referral):  Pt was at work and injured her shoulder while trying to catch a falling object. Had a biceps tenodesis and labral debridement on 25. Pts just is a . Job duties include primarily computer work.  Patient Stated Goal(s):  \"Improve my shoulder function\"  Initial Pain Level:    8   /10   Post Session Pain Level:    7  /10  Past Medical History/Comorbidities:   Ms. Rodriguez  has a past medical history of Abnormal Papanicolaou smear of cervix.  Ms. Rodriguez  has a past surgical history that includes Dilation

## 2025-04-25 NOTE — PROGRESS NOTES
Jamaica A Michael  : 1980  Primary: Generic Self-insured Wc (Worker's Comp)  Secondary:  [facility]  [address] [place] [address]  Phone: [phone]  Fax: [phone] Plan Frequency: 2x/week for 90 days    Plan of Care/Certification Expiration Date: 25        Plan of Care/Certification Expiration Date:  Plan of Care/Certification Expiration Date: 25    Frequency/Duration: Plan Frequency: 2x/week for 90 days      Time In/Out:   Time In: 1100  Time Out: 1145      PT Visit Info:         Visit Count:  1    OUTPATIENT PHYSICAL THERAPY:   Treatment Note 2025       Episode  (s/p L Bicep Tenodesis)               Treatment Diagnosis:    Left shoulder pain, unspecified chronicity  Orthopedic aftercare  Labral tear of long head of left biceps tendon, sequela  Medical/Referring Diagnosis:    Left shoulder pain, unspecified chronicity  Superior glenoid labrum lesion of left shoulder, initial encounter  Incomplete tear of left rotator cuff, unspecified whether traumatic      Referring Physician:  Olga Buckley PA MD Orders:  PT Eval and Treat   Return MD Appt:     Date of Onset:  DoS: 25  Allergies:   Patient has no known allergies.  Restrictions/Precautions:   Post Surgical Precautions: Biceps tenodesis protocol      Interventions Planned (Treatment may consist of any combination of the following):     See Assessment Note    Subjective Comments:   See Evaluation Note from today  Initial Pain Level:      /10  Post Session Pain Level:       /10  Medications Last Reviewed: 2025  Updated Objective Findings:  See Evaluation Note from today  Treatment   THERAPEUTIC EXERCISE: (25 minutes):    Exercises per grid below to improve mobility, strength, and coordination.  Required minimal visual, verbal, manual, and tactile cues to promote proper body posture and promote proper body mechanics.  Progressed resistance, range, repetitions, and complexity

## 2025-04-28 ENCOUNTER — HOSPITAL ENCOUNTER (OUTPATIENT)
Dept: PHYSICAL THERAPY | Age: 45
Setting detail: RECURRING SERIES
Discharge: HOME | End: 2025-05-01
Payer: COMMERCIAL

## 2025-04-28 PROCEDURE — 97110 THERAPEUTIC EXERCISES: CPT

## 2025-04-28 NOTE — PROGRESS NOTES
Jamaica MILLIGAN Michael  : 1980  Primary: Generic Self-insured Wc (Worker's Comp)  Secondary:  [facility]  [address] [place] [address]  Phone: [phone]  Fax: [phone] Plan Frequency: 2x/week for 90 days    Plan of Care/Certification Expiration Date: 25        Plan of Care/Certification Expiration Date:  Plan of Care/Certification Expiration Date: 25    Frequency/Duration: Plan Frequency: 2x/week for 90 days      Time In/Out:   Time In: 1145  Time Out: 1225      PT Visit Info:         Visit Count:  2    OUTPATIENT PHYSICAL THERAPY:   Treatment Note 2025       Episode  (s/p L Bicep Tenodesis)               Treatment Diagnosis:    Left shoulder pain, unspecified chronicity  Orthopedic aftercare  Labral tear of long head of left biceps tendon, sequela  Medical/Referring Diagnosis:    Left shoulder pain, unspecified chronicity  Superior glenoid labrum lesion of left shoulder, initial encounter  Incomplete tear of left rotator cuff, unspecified whether traumatic      Referring Physician:  Olga Buckley PA MD Orders:  PT Eval and Treat   Return MD Appt:     Date of Onset:  DoS: 25  Allergies:   Patient has no known allergies.  Restrictions/Precautions:   Post Surgical Precautions: Biceps tenodesis protocol      Interventions Planned (Treatment may consist of any combination of the following):     See Assessment Note    Subjective Comments:   Pt states she has been sore  Initial Pain Level:    4  /10  Post Session Pain Level:      3 /10  Medications Last Reviewed: 2025  Updated Objective Findings:  None Today  Treatment   THERAPEUTIC EXERCISE: (40 minutes):    Exercises per grid below to improve mobility, strength, and coordination.  Required minimal visual, verbal, manual, and tactile cues to promote proper body posture and promote proper body mechanics.  Progressed resistance, range, repetitions, and complexity of movement as

## 2025-04-30 DIAGNOSIS — M25.512 LEFT SHOULDER PAIN, UNSPECIFIED CHRONICITY: ICD-10-CM

## 2025-04-30 RX ORDER — IBUPROFEN 800 MG/1
800 TABLET, FILM COATED ORAL EVERY 8 HOURS PRN
Qty: 60 TABLET | Refills: 1 | OUTPATIENT
Start: 2025-04-30

## 2025-05-02 NOTE — PROGRESS NOTES
Name: Jamaica Rodriguez  YOB: 1980  Gender: female  MRN: [id number]    Procedure Performed:   1) left Shoulder subpectoral biceps tenodesis   2) left Shoulder Arthroscopy with debridement of rotator cuff, labrum, glenohumeral capsule  3) left Shoulder Arthroscopy with subacromial decompression    Date of Procedure: 4/23/25    Subjective: Returns 2 weeks status post the above procedure.  States she is doing well.  She is only taking Tylenol for pain but does note she has pain at night and pain    Physical Examination: Incisions clean dry and intact, no sign of infection.  Biceps tendon had minimal drainage noted.  Motor and sensory intact.  Distal radial pulse 2+.  Full elbow and wrist and finger range of motion present.     Assessment:   1. Status post orthopedic surgery, follow-up exam       Plan:   Doing well.   Continue PT per Biceps tenodesis protocol -rehabbing with well at [facility].  Follow up in 4 weeks    This workcomp patient -she received a work note today stating out of work until reevaluated by Dr. Castorena.  This appointment is in 4 weeks.    MALIKA CharlesC  Orthopaedics and Sports Medicine

## 2025-05-05 ENCOUNTER — OFFICE VISIT (OUTPATIENT)
Dept: ORTHOPEDIC SURGERY | Age: 45
End: 2025-05-05

## 2025-05-05 DIAGNOSIS — Z09 STATUS POST ORTHOPEDIC SURGERY, FOLLOW-UP EXAM: Primary | ICD-10-CM

## 2025-05-05 PROCEDURE — 99024 POSTOP FOLLOW-UP VISIT: CPT | Performed by: PHYSICIAN ASSISTANT

## 2025-05-05 RX ORDER — OXYCODONE HYDROCHLORIDE 5 MG/1
5 TABLET ORAL EVERY 6 HOURS PRN
Qty: 20 TABLET | Refills: 0 | Status: SHIPPED | OUTPATIENT
Start: 2025-05-05 | End: 2025-05-10

## 2025-05-06 ENCOUNTER — HOSPITAL ENCOUNTER (OUTPATIENT)
Dept: PHYSICAL THERAPY | Age: 45
Setting detail: RECURRING SERIES
Discharge: HOME | End: 2025-05-09
Payer: COMMERCIAL

## 2025-05-06 PROCEDURE — 97110 THERAPEUTIC EXERCISES: CPT

## 2025-05-06 NOTE — PROGRESS NOTES
Jamaica MILLIGAN Michael  : 1980  Primary: Generic Self-insured Wc (Worker's Comp)  Secondary:  [facility]  [address] [place] [address]  Phone: [phone]  Fax: [phone] Plan Frequency: 2x/week for 90 days    Plan of Care/Certification Expiration Date: 25        Plan of Care/Certification Expiration Date:  Plan of Care/Certification Expiration Date: 25    Frequency/Duration: Plan Frequency: 2x/week for 90 days      Time In/Out:   Time In: 1350  Time Out: 1430      PT Visit Info:         Visit Count:  3    OUTPATIENT PHYSICAL THERAPY:   Treatment Note 2025       Episode  (s/p L Bicep Tenodesis)               Treatment Diagnosis:    Left shoulder pain, unspecified chronicity  Orthopedic aftercare  Labral tear of long head of left biceps tendon, sequela  Medical/Referring Diagnosis:    Left shoulder pain, unspecified chronicity  Superior glenoid labrum lesion of left shoulder, initial encounter  Incomplete tear of left rotator cuff, unspecified whether traumatic      Referring Physician:  Olga Buckley PA MD Orders:  PT Eval and Treat   Return MD Appt:     Date of Onset:  DoS: 25  Allergies:   Patient has no known allergies.  Restrictions/Precautions:   Post Surgical Precautions: Biceps tenodesis protocol      Interventions Planned (Treatment may consist of any combination of the following):     See Assessment Note    Subjective Comments:   Pt states she is doing ok, sore  Initial Pain Level:    4  /10  Post Session Pain Level:      3 /10  Medications Last Reviewed: 2025  Updated Objective Findings:  None Today  Treatment   THERAPEUTIC EXERCISE: (40 minutes):    Exercises per grid below to improve mobility, strength, and coordination.  Required minimal visual, verbal, manual, and tactile cues to promote proper body posture and promote proper body mechanics.  Progressed resistance, range, repetitions, and complexity of movement as

## 2025-05-08 ENCOUNTER — HOSPITAL ENCOUNTER (OUTPATIENT)
Dept: PHYSICAL THERAPY | Age: 45
Setting detail: RECURRING SERIES
Discharge: HOME | End: 2025-05-11
Payer: COMMERCIAL

## 2025-05-08 PROCEDURE — 97110 THERAPEUTIC EXERCISES: CPT

## 2025-05-08 NOTE — PROGRESS NOTES
movement as indicated.     Date:  4/28 Date:  5/6 Date  5/8   Activity/Exercise      Education      Elbow PROM Flex/Ext: x10min  Pron/Sup: x5min     Shoulder PROM ER/IR: x10min  Flexion: x15min ER/IR: x6min  Flexion: x7min ER/IR: x6min  Flexion: x7min   Shoulder AAROM  ER/IR: x6min w/ dowel  Flexion: x7min w/ dowel ER/IR: x6min w/ dowel  Flexion: x7min w/ dowel   Elbow AROM  Flex/Ext: x4min  Sup/Pro: x4min Flex/Ext: x4min  Sup/Pro: x4min   Pendulums  x6min X 3 min   Pulleys   X 5 min                         MANUAL THERAPY: (0 minutes):   Joint mobilization, Soft tissue mobilization, and Manipulation was utilized and necessary because of the patient's restricted joint motion, painful spasm, loss of articular motion, and restricted motion of soft tissue.      Date:   Date:   Date:     Activity/Exercise Parameters Parameters Parameters   Soft Tissue      Joint Mobilization      Dry Needling            Treatment/Session Summary:    Treatment Assessment: Patient tolerated treatment well today. Patient indicates it's still really tender with certain movements.    Communication/Consultation:  Therapy Evaluation sent to referring provider  Equipment provided today:  HEP and Theraband  Recommendations/Intent for next treatment session: Next visit will focus on progression per protocol.    >Total Treatment Billable Duration:  40 minutes   Time In: 0235  Time Out: 0315     Annette Gibbons PTA         Charge Capture  Events  [facility] Portal  Appt Desk  Attendance Report     Future Appointments   Date Time Provider Department Center   5/13/2025  1:00 PM Annette Gibbons PTA [facility]   5/15/2025  8:00 AM René Lei, PT [facility]   5/20/2025 11:15 AM René Lei PT [facility]   5/22/2025  1:00 PM René Lei PT [facility]   5/27/2025 11:15 AM René Lei PT [facility]   5/29/2025 11:15 AM René Lei, PT [facility]   6/2/2025  8:10 AM Hayder Castorena MD [facility]   6/2/2025

## 2025-05-13 ENCOUNTER — HOSPITAL ENCOUNTER (OUTPATIENT)
Dept: PHYSICAL THERAPY | Age: 45
Setting detail: RECURRING SERIES
Discharge: HOME | End: 2025-05-16
Payer: COMMERCIAL

## 2025-05-13 PROCEDURE — 97110 THERAPEUTIC EXERCISES: CPT

## 2025-05-13 NOTE — PROGRESS NOTES
Jamaica MILLIGAN iMchael  : 1980  Primary: Generic Self-insured Wc (Worker's Comp)  Secondary:  [facility]  [address] [place] [address]  Phone: [phone]  Fax: [phone] Plan Frequency: 2x/week for 90 days    Plan of Care/Certification Expiration Date: 25        Plan of Care/Certification Expiration Date:  Plan of Care/Certification Expiration Date: 25    Frequency/Duration: Plan Frequency: 2x/week for 90 days      Time In/Out:   Time In: 0100  Time Out: 0145      PT Visit Info:         Visit Count:  5    OUTPATIENT PHYSICAL THERAPY:   Treatment Note 2025       Episode  (s/p L Bicep Tenodesis)               Treatment Diagnosis:    Left shoulder pain, unspecified chronicity  Orthopedic aftercare  Labral tear of long head of left biceps tendon, sequela  Medical/Referring Diagnosis:    Left shoulder pain, unspecified chronicity  Superior glenoid labrum lesion of left shoulder, initial encounter  Incomplete tear of left rotator cuff, unspecified whether traumatic      Referring Physician:  Olga Buckley PA MD Orders:  PT Eval and Treat   Return MD Appt:     Date of Onset:  DoS: 25  Allergies:   Patient has no known allergies.  Restrictions/Precautions:   Post Surgical Precautions: Biceps tenodesis protocol      Interventions Planned (Treatment may consist of any combination of the following):     See Assessment Note    Subjective Comments: Patient reports shoulder feels about the same. Patient sates that she has been moving it more.    Initial Pain Level:    4  /10  Post Session Pain Level:      3 /10  Medications Last Reviewed: 2025  Updated Objective Findings:  None Today  Treatment   THERAPEUTIC EXERCISE: (45 minutes):    Exercises per grid below to improve mobility, strength, and coordination.  Required minimal visual, verbal, manual, and tactile cues to promote proper body posture and promote proper body mechanics.  Progressed

## 2025-05-14 RX ORDER — OXYCODONE HYDROCHLORIDE 5 MG/1
5 TABLET ORAL EVERY 4 HOURS PRN
COMMUNITY
Start: 2025-03-16

## 2025-05-14 NOTE — PERIOP NOTE
Patient verified name and .  Order for consent  was not found in EHR; patient verifies procedure.   Type 2 surgery, PHONE assessment complete.  Orders NOT received.  Labs per surgeon: No orders received.   Labs per anesthesia protocol: Hgb, T&S DOS; orders signed and held in EHR.     Patient instructed to come to outpatient lab, [place] [address] [address], any weekday, prior to surgery, between 0800 and 1500 to have lab work completed. Patient verbalized understanding.       Patient answered medical/surgical history questions at their best of ability. All prior to admission medications documented in EPIC.    Patient instructed to continue taking all prescription medications up to the day of surgery but to take only the following medications the day of surgery according to anesthesia guidelines with a small sip of water: Oxycodone PRN, Zofran PRN. Also, patient is requested to take 2 Tylenol in the morning and then again before bed on the day before surgery. Regular or extra strength may be used.       Patient informed that all vitamins and supplements should be held 7 days prior to surgery and NSAIDS 5 days prior to surgery.     Patient instructed on the following:    > Arrive at [facility] A Entrance, time of arrival to be called the day before by 1700  > No food after midnight, patient may drink clear liquids up until 2 hours prior to arrival. No gum, candy, mints.   > Responsible adult must drive patient to the hospital, stay during surgery, and patient will need supervision 24 hours after anesthesia  > Use non moisturizing soap in shower the night before surgery and on the morning of surgery  > All piercings must be removed prior to arrival.    > Leave all valuables (money and jewelry) at home but bring insurance card and ID on DOS.   > You may be required to pay a deductible or co-pay on the day of your procedure. You can pre-pay by calling [phone] if your surgery is at the [facility] or [phone] if your

## 2025-05-15 ENCOUNTER — APPOINTMENT (OUTPATIENT)
Dept: PHYSICAL THERAPY | Age: 45
End: 2025-05-15
Payer: COMMERCIAL

## 2025-05-20 ENCOUNTER — APPOINTMENT (OUTPATIENT)
Dept: PHYSICAL THERAPY | Age: 45
End: 2025-05-20
Payer: COMMERCIAL

## 2025-05-20 ENCOUNTER — HOSPITAL ENCOUNTER (OUTPATIENT)
Dept: PHYSICAL THERAPY | Age: 45
Setting detail: RECURRING SERIES
Discharge: HOME | End: 2025-05-23
Payer: COMMERCIAL

## 2025-05-20 PROCEDURE — 97140 MANUAL THERAPY 1/> REGIONS: CPT

## 2025-05-20 PROCEDURE — 97110 THERAPEUTIC EXERCISES: CPT

## 2025-05-20 NOTE — PROGRESS NOTES
Jamaica MILLIGAN Michael  : 1980  Primary: Generic Self-insured Wc (Worker's Comp)  Secondary:  [place] [address]  [address]  [facility] [address]  Phone: [phone]  Fax: [phone] Plan Frequency: 2x/week for 90 days    Plan of Care/Certification Expiration Date: 25        Plan of Care/Certification Expiration Date:  Plan of Care/Certification Expiration Date: 25    Frequency/Duration: Plan Frequency: 2x/week for 90 days      Time In/Out:   Time In: 1115  Time Out: 1200      PT Visit Info:         Visit Count:  6    OUTPATIENT PHYSICAL THERAPY:   Treatment Note 2025       Episode  (s/p L Bicep Tenodesis)               Treatment Diagnosis:    Left shoulder pain, unspecified chronicity  Orthopedic aftercare  Labral tear of long head of left biceps tendon, sequela  Medical/Referring Diagnosis:    Left shoulder pain, unspecified chronicity  Superior glenoid labrum lesion of left shoulder, initial encounter  Incomplete tear of left rotator cuff, unspecified whether traumatic      Referring Physician:  Olga Buckley PA MD Orders:  PT Eval and Treat   Return MD Appt:     Date of Onset:  DoS: 25  Allergies:   Patient has no known allergies.  Restrictions/Precautions:   Post Surgical Precautions: Biceps tenodesis protocol      Interventions Planned (Treatment may consist of any combination of the following):     See Assessment Note    Subjective Comments: Patient states she couldn't do much last week due to some medical issues so she feels more tight today    Initial Pain Level:    4  /10  Post Session Pain Level:      3 /10  Medications Last Reviewed: 2025  Updated Objective Findings:  None Today  Treatment   THERAPEUTIC EXERCISE: (35 minutes):    Exercises per grid below to improve mobility, strength, and coordination.  Required minimal visual, verbal, manual, and tactile cues to promote proper body posture and promote proper body mechanics.

## 2025-05-22 ENCOUNTER — APPOINTMENT (OUTPATIENT)
Dept: PHYSICAL THERAPY | Age: 45
End: 2025-05-22
Payer: COMMERCIAL

## 2025-05-22 ENCOUNTER — HOSPITAL ENCOUNTER (OUTPATIENT)
Dept: PHYSICAL THERAPY | Age: 45
Setting detail: RECURRING SERIES
Discharge: HOME | End: 2025-05-25
Payer: COMMERCIAL

## 2025-05-22 ENCOUNTER — HOSPITAL ENCOUNTER (OUTPATIENT)
Dept: LAB | Age: 45
Discharge: HOME | End: 2025-05-25
Payer: COMMERCIAL

## 2025-05-22 DIAGNOSIS — Z01.818 PRE-OP TESTING: ICD-10-CM

## 2025-05-22 LAB — HGB BLD-MCNC: 12.4 G/DL (ref 11.7–15.4)

## 2025-05-22 PROCEDURE — 97110 THERAPEUTIC EXERCISES: CPT

## 2025-05-22 PROCEDURE — 85018 HEMOGLOBIN: CPT

## 2025-05-22 PROCEDURE — 36415 COLL VENOUS BLD VENIPUNCTURE: CPT

## 2025-05-22 PROCEDURE — 97140 MANUAL THERAPY 1/> REGIONS: CPT

## 2025-05-22 NOTE — PROGRESS NOTES
Jamaica MILLIGAN Michael  : 1980  Primary: Generic Self-insured Wc (Worker's Comp)  Secondary:  [place] [address]  [address]  [facility] [address]  Phone: [phone]  Fax: [phone] Plan Frequency: 2x/week for 90 days    Plan of Care/Certification Expiration Date: 25        Plan of Care/Certification Expiration Date:  Plan of Care/Certification Expiration Date: 25    Frequency/Duration: Plan Frequency: 2x/week for 90 days      Time In/Out:   Time In: 1300  Time Out: 1345      PT Visit Info:         Visit Count:  7    OUTPATIENT PHYSICAL THERAPY:   Treatment Note 2025       Episode  (s/p L Bicep Tenodesis)               Treatment Diagnosis:    Left shoulder pain, unspecified chronicity  Orthopedic aftercare  Labral tear of long head of left biceps tendon, sequela  Medical/Referring Diagnosis:    Left shoulder pain, unspecified chronicity  Superior glenoid labrum lesion of left shoulder, initial encounter  Incomplete tear of left rotator cuff, unspecified whether traumatic      Referring Physician:  Olga Buckley PA MD Orders:  PT Eval and Treat   Return MD Appt:     Date of Onset:  DoS: 25  Allergies:   Patient has no known allergies.  Restrictions/Precautions:   Post Surgical Precautions: Biceps tenodesis protocol      Interventions Planned (Treatment may consist of any combination of the following):     See Assessment Note    Subjective Comments: Patient states she is a little more sore today    Initial Pain Level:    4  /10  Post Session Pain Level:      3 /10  Medications Last Reviewed: 2025  Updated Objective Findings:  None Today  Treatment   THERAPEUTIC EXERCISE: (35 minutes):    Exercises per grid below to improve mobility, strength, and coordination.  Required minimal visual, verbal, manual, and tactile cues to promote proper body posture and promote proper body mechanics.  Progressed resistance, range, repetitions, and complexity

## 2025-05-22 NOTE — H&P
PRE AB. I AB. S ECTOPICS MULTIPLE LIVING   2    1   1   Past Pregnancies    Date # Fetuses GA Wks Labor Length Birth Weight Sex Delivery Type Outcome Anesthesia Delivery Place  Labor Notes Source   2001 1       Full Term Birth     historical   Family History  Family History not reviewed (last reviewed 2025)  Paternal Aunt - Malignant neoplasm of ovary     - Malignant neoplasm of ovary   Maternal Aunt - Malignant tumor of breast   Social History  Social History not reviewed (last reviewed 2025)   Lifestyle  Do you feel stressed (tense, restless, nervous, or anxious, or unable to sleep at night)?: Only a little  Do you use your seat belt or car seat routinely?: Yes  Education and Occupation  Are you currently employed?: Yes  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  What is your relationship status?: Single  Are you sexually active?: Yes  Do you use protection during sex?: Always  How many children do you have?: 1  Home and Environment  Have there been any changes to your family or social situation?: No  Do you have any pets?: No  Do you have smoke and carbon monoxide detectors in your home?: Yes  Are you passively exposed to smoke?: No  Do you use sunscreen routinely?: Yes  Substance Use  Do you or have you ever smoked tobacco?: Never smoker  How many years have you smoked tobacco?: 0  Do you or have you ever used e-cigarettes or vape?: Never used electronic cigarettes  Do you or have you ever used smokeless tobacco?: Never used smokeless tobacco  What was the date of your most recent tobacco screening?: 2025  How many years have you consumed alcohol?: 0  Have you used IV drugs?: No  Public Health and Travel  Have you been to an area known to be high risk for COVID-19?: No  Do you reside in or have you traveled to an area where Ebola virus transmission is active?: No  Have you had percutaneous (e.g. needle stick) or mucous membrane exposure to blood or body

## 2025-05-27 ENCOUNTER — APPOINTMENT (OUTPATIENT)
Dept: PHYSICAL THERAPY | Age: 45
End: 2025-05-27
Payer: COMMERCIAL

## 2025-05-27 ENCOUNTER — HOSPITAL ENCOUNTER (OUTPATIENT)
Dept: PHYSICAL THERAPY | Age: 45
Setting detail: RECURRING SERIES
Discharge: HOME | End: 2025-05-30
Payer: COMMERCIAL

## 2025-05-27 PROCEDURE — 97140 MANUAL THERAPY 1/> REGIONS: CPT

## 2025-05-27 PROCEDURE — 97110 THERAPEUTIC EXERCISES: CPT

## 2025-05-27 NOTE — PROGRESS NOTES
Jamaica MILLIGAN Michael  : 1980  Primary: Generic Self-insured Wc (Worker's Comp)  Secondary:  [place] [address]  [address]  [facility] [address]  Phone: [phone]  Fax: [phone] Plan Frequency: 2x/week for 90 days    Plan of Care/Certification Expiration Date: 25        Plan of Care/Certification Expiration Date:  Plan of Care/Certification Expiration Date: 25    Frequency/Duration: Plan Frequency: 2x/week for 90 days      Time In/Out:   Time In: 1115  Time Out: 1200      PT Visit Info:         Visit Count:  8    OUTPATIENT PHYSICAL THERAPY:   Treatment Note 2025       Episode  (s/p L Bicep Tenodesis)               Treatment Diagnosis:    Left shoulder pain, unspecified chronicity  Orthopedic aftercare  Labral tear of long head of left biceps tendon, sequela  Medical/Referring Diagnosis:    Left shoulder pain, unspecified chronicity  Superior glenoid labrum lesion of left shoulder, initial encounter  Incomplete tear of left rotator cuff, unspecified whether traumatic      Referring Physician:  Olga Buckley PA MD Orders:  PT Eval and Treat   Return MD Appt:     Date of Onset:  DoS: 25  Allergies:   Patient has no known allergies.  Restrictions/Precautions:   Post Surgical Precautions: Biceps tenodesis protocol      Interventions Planned (Treatment may consist of any combination of the following):     See Assessment Note    Subjective Comments: Patient states she rolled over onto her side last night which made it a little sore    Initial Pain Level:    4  /10  Post Session Pain Level:      3 /10  Medications Last Reviewed: 2025  Updated Objective Findings:  None Today  Treatment   THERAPEUTIC EXERCISE: (35 minutes):    Exercises per grid below to improve mobility, strength, and coordination.  Required minimal visual, verbal, manual, and tactile cues to promote proper body posture and promote proper body mechanics.  Progressed resistance,

## 2025-05-29 ENCOUNTER — HOSPITAL ENCOUNTER (OUTPATIENT)
Dept: PHYSICAL THERAPY | Age: 45
Setting detail: RECURRING SERIES
End: 2025-05-29
Payer: COMMERCIAL

## 2025-05-29 ENCOUNTER — APPOINTMENT (OUTPATIENT)
Dept: PHYSICAL THERAPY | Age: 45
End: 2025-05-29
Payer: COMMERCIAL

## 2025-05-29 PROCEDURE — 97140 MANUAL THERAPY 1/> REGIONS: CPT

## 2025-05-29 PROCEDURE — 97110 THERAPEUTIC EXERCISES: CPT

## 2025-05-29 NOTE — PROGRESS NOTES
Name: Jamaica Rodriguez  YOB: 1980  Gender: female  MRN: [id number]    Procedure Performed:   1) left Shoulder subpectoral biceps tenodesis   2) left Shoulder Arthroscopy with debridement of rotator cuff, labrum, glenohumeral capsule  3) left Shoulder Arthroscopy with subacromial decompression      Date of Procedure: 04/23/2025      Subjective:Returns 6 weeks status post the above procedure.  She is improving with her pain and range of motion but is slowly progressing with range of motion.      Physical Examination:Incisions clean dry and intact, no sign of infection. Motor and sensory intact.  Active elevation she can only get about 45 degrees passively I can take her up to approximately 120 with a lot of guarding and periscapular tenderness and muscle spasm.  Rotation is pretty stiff.      Assessment:   1. Status post orthopedic surgery, follow-up exam         Plan:   Doing well overall just progressing slowly.  Stressed to her the importance of regaining the range of motion over the next month.  She is continue physical therapy 2-3 times a week.   Continue PT per  Biceps tenodesis protocol -rehabbing with Krunal Lei.  Work restrictions: She will be out of work to focus on regaining range of motion until her next appointment in 4 weeks  Follow up in 4 weeks for motion check and likely can return her to work with limited use of the left arm at that point

## 2025-05-29 NOTE — PROGRESS NOTES
Jamaica Rodriguez  : 1980  Primary: Generic Self-insured Wc (Worker's Comp)  Secondary:  [place] [address]  [address]  [facility] [address]  Phone: [phone]  Fax: [phone] Plan Frequency: 2x/week for 90 days    Plan of Care/Certification Expiration Date: 25        Plan of Care/Certification Expiration Date:  Plan of Care/Certification Expiration Date: 25    Frequency/Duration: Plan Frequency: 2x/week for 90 days      Time In/Out:          PT Visit Info:         Visit Count:  9                OUTPATIENT PHYSICAL THERAPY:             Progress Report 2025               Episode (s/p L Bicep Tenodesis)         Treatment Diagnosis:     Left shoulder pain, unspecified chronicity  Orthopedic aftercare  Labral tear of long head of left biceps tendon, sequela  Medical/Referring Diagnosis:    Left shoulder pain, unspecified chronicity  Superior glenoid labrum lesion of left shoulder, initial encounter  Incomplete tear of left rotator cuff, unspecified whether traumatic      Referring Physician:  Olga Buckley PA MD Orders:  PT Eval and Treat   Return MD Appt:    Date of Onset:    DoS: 25  Allergies:  Patient has no known allergies.  Restrictions/Precautions:    Post Surgical Precautions: Biceps Tenodesis Protocol      Medications Last Reviewed: 2025     SUBJECTIVE   History of Injury/Illness (Reason for Referral):  Pt was at work and injured her shoulder while trying to catch a falling object. Had a biceps tenodesis and labral debridement on 25. Pts just is a . Job duties include primarily computer work.  Patient Stated Goal(s):  \"Improve my shoulder function\"  Initial Pain Level:    8   /10   Post Session Pain Level:    7  /10  Past Medical History/Comorbidities:   Ms. Rodriguez  has a past medical history of Abnormal Papanicolaou smear of cervix and History of anemia.  Ms. Rodriguez  has a past surgical history that

## 2025-05-29 NOTE — PROGRESS NOTES
Jamaica MILLIGAN Michael  : 1980  Primary: Generic Self-insured Wc (Worker's Comp)  Secondary:  [place] [address]  [address]  [facility] [address]  Phone: [phone]  Fax: [phone] Plan Frequency: 2x/week for 90 days    Plan of Care/Certification Expiration Date: 25        Plan of Care/Certification Expiration Date:  Plan of Care/Certification Expiration Date: 25    Frequency/Duration: Plan Frequency: 2x/week for 90 days      Time In/Out:   Time In: 1115  Time Out: 1200      PT Visit Info:         Visit Count:  9    OUTPATIENT PHYSICAL THERAPY:   Treatment Note 2025       Episode  (s/p L Bicep Tenodesis)               Treatment Diagnosis:    Left shoulder pain, unspecified chronicity  Orthopedic aftercare  Labral tear of long head of left biceps tendon, sequela  Medical/Referring Diagnosis:    Left shoulder pain, unspecified chronicity  Superior glenoid labrum lesion of left shoulder, initial encounter  Incomplete tear of left rotator cuff, unspecified whether traumatic      Referring Physician:  Olga Buckley PA MD Orders:  PT Eval and Treat   Return MD Appt:     Date of Onset:  DoS: 25  Allergies:   Patient has no known allergies.  Restrictions/Precautions:   Post Surgical Precautions: Biceps tenodesis protocol      Interventions Planned (Treatment may consist of any combination of the following):     See Assessment Note    Subjective Comments: Patient states she is doing ok today, still just sore    Initial Pain Level:    4  /10  Post Session Pain Level:      3 /10  Medications Last Reviewed: 2025  Updated Objective Findings:  None Today  Treatment   THERAPEUTIC EXERCISE: (35 minutes):    Exercises per grid below to improve mobility, strength, and coordination.  Required minimal visual, verbal, manual, and tactile cues to promote proper body posture and promote proper body mechanics.  Progressed resistance, range, repetitions, and

## 2025-06-02 ENCOUNTER — HOSPITAL ENCOUNTER (OUTPATIENT)
Dept: PHYSICAL THERAPY | Age: 45
Setting detail: RECURRING SERIES
Discharge: HOME | End: 2025-06-05
Payer: COMMERCIAL

## 2025-06-02 ENCOUNTER — OFFICE VISIT (OUTPATIENT)
Dept: ORTHOPEDIC SURGERY | Age: 45
End: 2025-06-02

## 2025-06-02 ENCOUNTER — ANESTHESIA EVENT (OUTPATIENT)
Dept: SURGERY | Age: 45
End: 2025-06-02
Payer: COMMERCIAL

## 2025-06-02 DIAGNOSIS — Z09 STATUS POST ORTHOPEDIC SURGERY, FOLLOW-UP EXAM: Primary | ICD-10-CM

## 2025-06-02 PROCEDURE — 97110 THERAPEUTIC EXERCISES: CPT

## 2025-06-02 NOTE — PROGRESS NOTES
repetitions, and complexity of movement as indicated.     Date:  5/27 Date:  5/29 Date:  6/2   Activity/Exercise      Education  Reassessment    UBE X6min lvl 2 X6min lvl 2 X10min lvl 2.5   Shoulder PROM  X10min (flexion/ER) X14min (flexion/ER)   Shoulder AAROM X4min flexion in standing with bar X6min flexion in standing with bar X12min flexion in standing with bar   Wall Walks x4min x4min x4min   Elbow AROM x5min (flex/ext, sup/pro)     Pendulums X5min 5lbs     Pulleys      Wand exercises      Rows 3x10 blue TB 3x10 blue TB 3x10 15lbs bands   Low Rows 3x10 blue TB 3x10 blue TB 3x10 15lbs bands   Counter Walkbacks      ER/IR Walkouts      S/L ER      Walking      B ER 3x10 yellow TB 3x10 yellow TB        MANUAL THERAPY: (0 minutes):   Joint mobilization, Soft tissue mobilization, and Manipulation was utilized and necessary because of the patient's restricted joint motion, painful spasm, loss of articular motion, and restricted motion of soft tissue.      Date:  5/22 Date:  5/27 Date:  5/29   Activity/Exercise Parameters Parameters    Soft Tissue Pec minor, posterior delt Pec minor, posterior delt Pec minor, posterior delt   Joint Mobilization      Dry Needling            Treatment/Session Summary:    Treatment Assessment: Pt showed improvements in PROM, AAROM and AROM. Greater ease of movements as well as total range    Communication/Consultation:  None today  Equipment provided today:  HEP and Theraband  Recommendations/Intent for next treatment session: Next visit will focus on progression per protocol.    >Total Treatment Billable Duration:  45 minutes   Time In: 0945  Time Out: 1030     René Lei PT         Charge Capture  Events  [employer] Portal  Appt Desk  Attendance Report     Future Appointments   Date Time Provider Department Center   6/5/2025  3:15 PM René Lei PT [facility]   6/9/2025  8:15 AM René Lei PT [facility]   6/12/2025  1:00 PM René Lei PT [facility]

## 2025-06-03 ENCOUNTER — HOSPITAL ENCOUNTER (OUTPATIENT)
Age: 45
Setting detail: OUTPATIENT SURGERY
Discharge: HOME | End: 2025-06-03
Attending: OBSTETRICS & GYNECOLOGY | Admitting: OBSTETRICS & GYNECOLOGY
Payer: COMMERCIAL

## 2025-06-03 ENCOUNTER — ANESTHESIA (OUTPATIENT)
Dept: SURGERY | Age: 45
End: 2025-06-03
Payer: COMMERCIAL

## 2025-06-03 VITALS
SYSTOLIC BLOOD PRESSURE: 121 MMHG | HEIGHT: 65 IN | HEART RATE: 73 BPM | BODY MASS INDEX: 30.42 KG/M2 | TEMPERATURE: 97.9 F | RESPIRATION RATE: 16 BRPM | OXYGEN SATURATION: 97 % | DIASTOLIC BLOOD PRESSURE: 79 MMHG | WEIGHT: 182.6 LBS

## 2025-06-03 DIAGNOSIS — D25.1 INTRAMURAL LEIOMYOMA OF UTERUS: ICD-10-CM

## 2025-06-03 DIAGNOSIS — Z01.818 PRE-OP TESTING: Primary | ICD-10-CM

## 2025-06-03 DIAGNOSIS — R10.2 PELVIC PAIN SYNDROME: ICD-10-CM

## 2025-06-03 DIAGNOSIS — N92.0 EXCESSIVE OR FREQUENT MENSTRUATION: ICD-10-CM

## 2025-06-03 DIAGNOSIS — D50.0 IRON DEFICIENCY ANEMIA SECONDARY TO BLOOD LOSS (CHRONIC): ICD-10-CM

## 2025-06-03 LAB
ABO + RH BLD: NORMAL
BLOOD GROUP ANTIBODIES SERPL: NORMAL
HCG UR QL: NEGATIVE
SPECIMEN EXP DATE BLD: NORMAL

## 2025-06-03 PROCEDURE — 6370000000 HC RX 637 (ALT 250 FOR IP): Performed by: OBSTETRICS & GYNECOLOGY

## 2025-06-03 PROCEDURE — 2709999900 HC NON-CHARGEABLE SUPPLY: Performed by: OBSTETRICS & GYNECOLOGY

## 2025-06-03 PROCEDURE — 3600000009 HC SURGERY ROBOT BASE: Performed by: OBSTETRICS & GYNECOLOGY

## 2025-06-03 PROCEDURE — 6370000000 HC RX 637 (ALT 250 FOR IP): Performed by: ANESTHESIOLOGY

## 2025-06-03 PROCEDURE — 81025 URINE PREGNANCY TEST: CPT

## 2025-06-03 PROCEDURE — 7100000010 HC PHASE II RECOVERY - FIRST 15 MIN: Performed by: OBSTETRICS & GYNECOLOGY

## 2025-06-03 PROCEDURE — C1765 ADHESION BARRIER: HCPCS | Performed by: OBSTETRICS & GYNECOLOGY

## 2025-06-03 PROCEDURE — 6360000002 HC RX W HCPCS: Performed by: ANESTHESIOLOGY

## 2025-06-03 PROCEDURE — 6360000002 HC RX W HCPCS: Performed by: OBSTETRICS & GYNECOLOGY

## 2025-06-03 PROCEDURE — 3600000019 HC SURGERY ROBOT ADDTL 15MIN: Performed by: OBSTETRICS & GYNECOLOGY

## 2025-06-03 PROCEDURE — S2900 ROBOTIC SURGICAL SYSTEM: HCPCS | Performed by: OBSTETRICS & GYNECOLOGY

## 2025-06-03 PROCEDURE — 86850 RBC ANTIBODY SCREEN: CPT

## 2025-06-03 PROCEDURE — 7100000011 HC PHASE II RECOVERY - ADDTL 15 MIN: Performed by: OBSTETRICS & GYNECOLOGY

## 2025-06-03 PROCEDURE — 6360000002 HC RX W HCPCS: Performed by: NURSE ANESTHETIST, CERTIFIED REGISTERED

## 2025-06-03 PROCEDURE — 86900 BLOOD TYPING SEROLOGIC ABO: CPT

## 2025-06-03 PROCEDURE — 86901 BLOOD TYPING SEROLOGIC RH(D): CPT

## 2025-06-03 PROCEDURE — 2580000003 HC RX 258: Performed by: ANESTHESIOLOGY

## 2025-06-03 PROCEDURE — 88307 TISSUE EXAM BY PATHOLOGIST: CPT

## 2025-06-03 PROCEDURE — 2720000010 HC SURG SUPPLY STERILE: Performed by: OBSTETRICS & GYNECOLOGY

## 2025-06-03 PROCEDURE — 3700000000 HC ANESTHESIA ATTENDED CARE: Performed by: OBSTETRICS & GYNECOLOGY

## 2025-06-03 PROCEDURE — 2500000003 HC RX 250 WO HCPCS: Performed by: NURSE ANESTHETIST, CERTIFIED REGISTERED

## 2025-06-03 PROCEDURE — 7100000001 HC PACU RECOVERY - ADDTL 15 MIN: Performed by: OBSTETRICS & GYNECOLOGY

## 2025-06-03 PROCEDURE — 3700000001 HC ADD 15 MINUTES (ANESTHESIA): Performed by: OBSTETRICS & GYNECOLOGY

## 2025-06-03 PROCEDURE — 7100000000 HC PACU RECOVERY - FIRST 15 MIN: Performed by: OBSTETRICS & GYNECOLOGY

## 2025-06-03 RX ORDER — NALOXONE HYDROCHLORIDE 0.4 MG/ML
INJECTION, SOLUTION INTRAMUSCULAR; INTRAVENOUS; SUBCUTANEOUS PRN
Status: DISCONTINUED | OUTPATIENT
Start: 2025-06-03 | End: 2025-06-03 | Stop reason: HOSPADM

## 2025-06-03 RX ORDER — LIDOCAINE HYDROCHLORIDE 10 MG/ML
1 INJECTION, SOLUTION INFILTRATION; PERINEURAL
Status: DISCONTINUED | OUTPATIENT
Start: 2025-06-03 | End: 2025-06-03 | Stop reason: HOSPADM

## 2025-06-03 RX ORDER — DEXAMETHASONE SODIUM PHOSPHATE 10 MG/ML
INJECTION, SOLUTION INTRA-ARTICULAR; INTRALESIONAL; INTRAMUSCULAR; INTRAVENOUS; SOFT TISSUE
Status: DISCONTINUED | OUTPATIENT
Start: 2025-06-03 | End: 2025-06-03 | Stop reason: SDUPTHER

## 2025-06-03 RX ORDER — GLYCOPYRROLATE 0.2 MG/ML
INJECTION INTRAMUSCULAR; INTRAVENOUS
Status: DISCONTINUED | OUTPATIENT
Start: 2025-06-03 | End: 2025-06-03 | Stop reason: SDUPTHER

## 2025-06-03 RX ORDER — SODIUM CHLORIDE 9 MG/ML
INJECTION, SOLUTION INTRAVENOUS PRN
Status: DISCONTINUED | OUTPATIENT
Start: 2025-06-03 | End: 2025-06-03 | Stop reason: HOSPADM

## 2025-06-03 RX ORDER — KETAMINE HCL IN NACL, ISO-OSM 20 MG/2 ML
SYRINGE (ML) INJECTION
Status: DISCONTINUED | OUTPATIENT
Start: 2025-06-03 | End: 2025-06-03 | Stop reason: SDUPTHER

## 2025-06-03 RX ORDER — PROPOFOL 10 MG/ML
INJECTION, EMULSION INTRAVENOUS
Status: DISCONTINUED | OUTPATIENT
Start: 2025-06-03 | End: 2025-06-03 | Stop reason: SDUPTHER

## 2025-06-03 RX ORDER — ACETAMINOPHEN 500 MG
1000 TABLET ORAL EVERY 6 HOURS PRN
COMMUNITY

## 2025-06-03 RX ORDER — SODIUM CHLORIDE 0.9 % (FLUSH) 0.9 %
5-40 SYRINGE (ML) INJECTION PRN
Status: DISCONTINUED | OUTPATIENT
Start: 2025-06-03 | End: 2025-06-03 | Stop reason: HOSPADM

## 2025-06-03 RX ORDER — MIDAZOLAM HYDROCHLORIDE 1 MG/ML
INJECTION, SOLUTION INTRAMUSCULAR; INTRAVENOUS
Status: DISCONTINUED | OUTPATIENT
Start: 2025-06-03 | End: 2025-06-03 | Stop reason: SDUPTHER

## 2025-06-03 RX ORDER — OXYCODONE HYDROCHLORIDE 5 MG/1
10 TABLET ORAL PRN
Status: COMPLETED | OUTPATIENT
Start: 2025-06-03 | End: 2025-06-03

## 2025-06-03 RX ORDER — LABETALOL HYDROCHLORIDE 5 MG/ML
INJECTION, SOLUTION INTRAVENOUS
Status: DISCONTINUED | OUTPATIENT
Start: 2025-06-03 | End: 2025-06-03 | Stop reason: SDUPTHER

## 2025-06-03 RX ORDER — SCOPOLAMINE 1 MG/3D
1 PATCH, EXTENDED RELEASE TRANSDERMAL
Status: DISCONTINUED | OUTPATIENT
Start: 2025-06-03 | End: 2025-06-03 | Stop reason: HOSPADM

## 2025-06-03 RX ORDER — SODIUM CHLORIDE, SODIUM LACTATE, POTASSIUM CHLORIDE, CALCIUM CHLORIDE 600; 310; 30; 20 MG/100ML; MG/100ML; MG/100ML; MG/100ML
INJECTION, SOLUTION INTRAVENOUS CONTINUOUS
Status: DISCONTINUED | OUTPATIENT
Start: 2025-06-03 | End: 2025-06-03 | Stop reason: HOSPADM

## 2025-06-03 RX ORDER — NEOSTIGMINE METHYLSULFATE 1 MG/ML
INJECTION INTRAVENOUS
Status: DISCONTINUED | OUTPATIENT
Start: 2025-06-03 | End: 2025-06-03 | Stop reason: SDUPTHER

## 2025-06-03 RX ORDER — ROCURONIUM BROMIDE 10 MG/ML
INJECTION, SOLUTION INTRAVENOUS
Status: DISCONTINUED | OUTPATIENT
Start: 2025-06-03 | End: 2025-06-03 | Stop reason: SDUPTHER

## 2025-06-03 RX ORDER — PROCHLORPERAZINE EDISYLATE 5 MG/ML
5 INJECTION INTRAMUSCULAR; INTRAVENOUS
Status: DISCONTINUED | OUTPATIENT
Start: 2025-06-03 | End: 2025-06-03 | Stop reason: HOSPADM

## 2025-06-03 RX ORDER — EPHEDRINE SULFATE 5 MG/ML
INJECTION INTRAVENOUS
Status: DISCONTINUED | OUTPATIENT
Start: 2025-06-03 | End: 2025-06-03 | Stop reason: SDUPTHER

## 2025-06-03 RX ORDER — ONDANSETRON 2 MG/ML
4 INJECTION INTRAMUSCULAR; INTRAVENOUS
Status: COMPLETED | OUTPATIENT
Start: 2025-06-03 | End: 2025-06-03

## 2025-06-03 RX ORDER — ACETAMINOPHEN 500 MG
1000 TABLET ORAL ONCE
Status: COMPLETED | OUTPATIENT
Start: 2025-06-03 | End: 2025-06-03

## 2025-06-03 RX ORDER — SODIUM CHLORIDE 0.9 % (FLUSH) 0.9 %
5-40 SYRINGE (ML) INJECTION EVERY 12 HOURS SCHEDULED
Status: DISCONTINUED | OUTPATIENT
Start: 2025-06-03 | End: 2025-06-03 | Stop reason: HOSPADM

## 2025-06-03 RX ORDER — FENTANYL CITRATE 50 UG/ML
INJECTION, SOLUTION INTRAMUSCULAR; INTRAVENOUS
Status: DISCONTINUED | OUTPATIENT
Start: 2025-06-03 | End: 2025-06-03 | Stop reason: SDUPTHER

## 2025-06-03 RX ORDER — ACETAMINOPHEN 500 MG
1000 TABLET ORAL ONCE
Status: DISCONTINUED | OUTPATIENT
Start: 2025-06-03 | End: 2025-06-03

## 2025-06-03 RX ORDER — PHENAZOPYRIDINE HYDROCHLORIDE 95 MG/1
200 TABLET ORAL ONCE
Status: COMPLETED | OUTPATIENT
Start: 2025-06-03 | End: 2025-06-03

## 2025-06-03 RX ORDER — LIDOCAINE HYDROCHLORIDE 20 MG/ML
INJECTION, SOLUTION EPIDURAL; INFILTRATION; INTRACAUDAL; PERINEURAL
Status: DISCONTINUED | OUTPATIENT
Start: 2025-06-03 | End: 2025-06-03 | Stop reason: SDUPTHER

## 2025-06-03 RX ORDER — MIDAZOLAM HYDROCHLORIDE 2 MG/2ML
2 INJECTION, SOLUTION INTRAMUSCULAR; INTRAVENOUS
Status: DISCONTINUED | OUTPATIENT
Start: 2025-06-03 | End: 2025-06-03 | Stop reason: HOSPADM

## 2025-06-03 RX ORDER — ONDANSETRON 2 MG/ML
INJECTION INTRAMUSCULAR; INTRAVENOUS
Status: DISCONTINUED | OUTPATIENT
Start: 2025-06-03 | End: 2025-06-03 | Stop reason: SDUPTHER

## 2025-06-03 RX ORDER — OXYCODONE HYDROCHLORIDE 5 MG/1
5 TABLET ORAL PRN
Status: COMPLETED | OUTPATIENT
Start: 2025-06-03 | End: 2025-06-03

## 2025-06-03 RX ADMIN — SODIUM CHLORIDE, SODIUM LACTATE, POTASSIUM CHLORIDE, AND CALCIUM CHLORIDE 75 ML/HR: .6; .31; .03; .02 INJECTION, SOLUTION INTRAVENOUS at 10:34

## 2025-06-03 RX ADMIN — EPHEDRINE SULFATE 10 MG: 5 INJECTION INTRAVENOUS at 13:19

## 2025-06-03 RX ADMIN — NEOSTIGMINE METHYLSULFATE 3 MG: 1 INJECTION INTRAVENOUS at 15:29

## 2025-06-03 RX ADMIN — DEXAMETHASONE SODIUM PHOSPHATE 10 MG: 10 INJECTION INTRAMUSCULAR; INTRAVENOUS at 13:24

## 2025-06-03 RX ADMIN — ONDANSETRON 4 MG: 2 INJECTION, SOLUTION INTRAMUSCULAR; INTRAVENOUS at 16:36

## 2025-06-03 RX ADMIN — MIDAZOLAM 2 MG: 1 INJECTION INTRAMUSCULAR; INTRAVENOUS at 13:14

## 2025-06-03 RX ADMIN — Medication 2000 MG: at 13:24

## 2025-06-03 RX ADMIN — ROCURONIUM BROMIDE 50 MG: 10 INJECTION, SOLUTION INTRAVENOUS at 13:16

## 2025-06-03 RX ADMIN — ONDANSETRON 4 MG: 2 INJECTION, SOLUTION INTRAMUSCULAR; INTRAVENOUS at 13:24

## 2025-06-03 RX ADMIN — HYDROMORPHONE HYDROCHLORIDE 0.5 MG: 1 INJECTION, SOLUTION INTRAMUSCULAR; INTRAVENOUS; SUBCUTANEOUS at 16:03

## 2025-06-03 RX ADMIN — OXYCODONE 10 MG: 5 TABLET ORAL at 16:36

## 2025-06-03 RX ADMIN — URINARY PAIN RELIEF 190 MG: 95 TABLET ORAL at 16:35

## 2025-06-03 RX ADMIN — FENTANYL CITRATE 100 MCG: 50 INJECTION, SOLUTION INTRAMUSCULAR; INTRAVENOUS at 13:46

## 2025-06-03 RX ADMIN — GLYCOPYRROLATE 0.4 MG: 0.2 INJECTION INTRAMUSCULAR; INTRAVENOUS at 15:29

## 2025-06-03 RX ADMIN — PHENYLEPHRINE HYDROCHLORIDE 100 MCG: 0.1 INJECTION, SOLUTION INTRAVENOUS at 13:31

## 2025-06-03 RX ADMIN — PHENYLEPHRINE HYDROCHLORIDE 100 MCG: 0.1 INJECTION, SOLUTION INTRAVENOUS at 13:19

## 2025-06-03 RX ADMIN — LIDOCAINE HYDROCHLORIDE 60 MG: 20 INJECTION, SOLUTION EPIDURAL; INFILTRATION; INTRACAUDAL; PERINEURAL at 13:16

## 2025-06-03 RX ADMIN — LABETALOL HYDROCHLORIDE 10 MG: 5 INJECTION INTRAVENOUS at 14:04

## 2025-06-03 RX ADMIN — HYDROMORPHONE HYDROCHLORIDE 0.5 MG: 1 INJECTION, SOLUTION INTRAMUSCULAR; INTRAVENOUS; SUBCUTANEOUS at 16:17

## 2025-06-03 RX ADMIN — Medication 20 MG: at 13:16

## 2025-06-03 RX ADMIN — ACETAMINOPHEN 1000 MG: 500 TABLET, FILM COATED ORAL at 10:35

## 2025-06-03 RX ADMIN — LABETALOL HYDROCHLORIDE 10 MG: 5 INJECTION INTRAVENOUS at 14:15

## 2025-06-03 RX ADMIN — PROPOFOL 200 MG: 10 INJECTION, EMULSION INTRAVENOUS at 13:16

## 2025-06-03 RX ADMIN — EPHEDRINE SULFATE 10 MG: 5 INJECTION INTRAVENOUS at 13:31

## 2025-06-03 RX ADMIN — FENTANYL CITRATE 100 MCG: 50 INJECTION, SOLUTION INTRAMUSCULAR; INTRAVENOUS at 13:16

## 2025-06-03 ASSESSMENT — PAIN DESCRIPTION - LOCATION
LOCATION: ABDOMEN

## 2025-06-03 ASSESSMENT — PAIN DESCRIPTION - ORIENTATION
ORIENTATION: LOWER

## 2025-06-03 ASSESSMENT — PAIN SCALES - GENERAL
PAINLEVEL_OUTOF10: 5
PAINLEVEL_OUTOF10: 7
PAINLEVEL_OUTOF10: 8
PAINLEVEL_OUTOF10: 7
PAINLEVEL_OUTOF10: 7
PAINLEVEL_OUTOF10: 5

## 2025-06-03 ASSESSMENT — PAIN DESCRIPTION - DESCRIPTORS
DESCRIPTORS: CRAMPING;SPASM
DESCRIPTORS: CRAMPING
DESCRIPTORS: CRAMPING;SPASM

## 2025-06-03 ASSESSMENT — PAIN - FUNCTIONAL ASSESSMENT: PAIN_FUNCTIONAL_ASSESSMENT: 0-10

## 2025-06-03 NOTE — ANESTHESIA PRE PROCEDURE
Department of Anesthesiology  Preprocedure Note       Name:  Jamaica Rodriguez   Age:  45 y.o.  :  1980                                          MRN:  [id number]         Date:  6/3/2025      Surgeon: Surgeon(s):  Audrey Thomas MD    Procedure: Procedure(s):  HYSTERECTOMY ABDOMINAL LAPAROSCOPIC ROBOTIC WITH BILATERAL SALPINGECTOMIES    Medications prior to admission:   Prior to Admission medications    Medication Sig Start Date End Date Taking? Authorizing Provider   acetaminophen (TYLENOL) 500 MG tablet Take 2 tablets by mouth every 6 hours as needed for Pain   Yes Provider, MD Damon   oxyCODONE (ROXICODONE) 5 MG immediate release tablet Take 1 tablet by mouth every 4 hours as needed for Pain. 3/16/25  Yes ProviderDamon MD   ondansetron (ZOFRAN-ODT) 4 MG disintegrating tablet Take 1 tablet by mouth every 6 hours Take 20 minutes prior to pain medication for nausea prevention 25  Yes Olga Buckley PA   ibuprofen (ADVIL;MOTRIN) 800 MG tablet Take 1 tablet by mouth every 8 hours as needed for Pain  Patient not taking: Reported on 2025 3/17/25   Hayder Castorena MD   ibuprofen (ADVIL;MOTRIN) 800 MG tablet TAKE 1 TABLET BY MOUTH EVERY 8 HOURS AS NEEDED FOR PAIN  Patient not taking: Reported on 3/17/2025 10/23/23   Vianey Lynn, APRN - CNP       Current medications:    Current Facility-Administered Medications   Medication Dose Route Frequency Provider Last Rate Last Admin   • lactated ringers infusion   IntraVENous Continuous Audrey Thomas MD       • sodium chloride flush 0.9 % injection 5-40 mL  5-40 mL IntraVENous 2 times per day Audrey Thomas MD       • sodium chloride flush 0.9 % injection 5-40 mL  5-40 mL IntraVENous PRN Audrey Thomas MD       • 0.9 % sodium chloride infusion   IntraVENous PRN Audrey Thomas MD       • ceFAZolin (ANCEF) 2000 mg in sterile water 20 mL IV syringe  2,000 mg IntraVENous On Call to OR Audrey Thomas MD       • lidocaine 1 % injection 1

## 2025-06-03 NOTE — OP NOTE
visualization using an Optiscope. Intraperitoneal placement was confirmed again with the scope.  Two more 8 mm skin incisions were made in the right lower quadrant and left lower quadrant, approximately 10 to 12 cm from the umbilicus on   either side. Two robotic ports were then placed in these incisions under   direct visualization as well as one infraumbilically. Hemostasis was assured throughout.      Survey of the patient's abdomen revealed a normal liver edge and a normal appendix. The uterus was enlarged to 12 weeks size.   Both ovaries were very adherent to the underlying bowel and the posterior uterus.  The bowel was completely obliterating the cul-de-sac with its fusion to the lower posterior uterus and both ovaries.  This was even more extensive on the right side. The tubes appeared normal.  The uterus had several apparent fibroids.   The bladder reflection was clean of endometriosis.      At this point, the robot was then docked and I unscrubbed to take over at the   robotic console. A Synchroseal and a monopolar endoshears were used   robotically to perform the procedure. The bowel was first dissected off the right adnexa.   The ovaries were then dissected off the posterior uterus and the underlying bowel to free them.  The bowel was then carefully dissected off the posterior uterus and cervix.  Good hemostasis was assured.  Extensive enterolysis/dissection of bowel off both ovaries and posterior uterus required an extra 30 minutes of dissection over a normal adhesiolysis of 10 minutes. (Total =40 min). Good hemostasis was assured.  Both fallopian tubes were dissected off the ovarian pedicles to be removed with the uterus.  The utero-ovarian ligaments on both sides were cauterized and transected with good hemostasis. Round ligaments were then also cauterized and transected with good hemostasis.   The uterine arteries were then skeletonized bilaterally. The bladder flap   was then created with blunt and

## 2025-06-03 NOTE — PERIOP NOTE
Patient has met discharge criteria. Ambulating with ease without assistance. Tolerating oral intake well; denies nausea or vomiting. Patient states pain tolerable with PO meds. Pt able to void 100 ml at 1645.Patient provided with discharge instructions and verbalized understanding. No immediate concerns noted at time of discharge. Pt feels comfortable d/c home at this time.

## 2025-06-03 NOTE — DISCHARGE INSTRUCTIONS
Total Laparoscopic Hysterectomy: What to Expect at Home  Your Recovery      Total laparoscopic hysterectomy (TLH) removes the uterus through the vagina. Your doctor used a lighted tube and surgical tools inserted through small cuts (incisions) in the belly. Then the doctor made a small cut in the vagina. Your uterus was taken out through this cut.  You can expect to feel better and stronger each day. But you might need pain medicine for a week or two. You may get tired easily or have less energy than usual. This may last for several weeks after surgery. You will probably notice that your belly is swollen and puffy. This is common. The swelling will take several weeks to go down. It may take about 4 to 6 weeks to fully recover. The recovery time may be shorter for some people.    You may have some light vaginal bleeding. Don't have sex until the doctor says it's okay. Don't douche or put anything into your vagina, such as a tampon, until your doctor says it's okay.  It's important to avoid lifting while you are recovering so that you can heal.  This care sheet gives you a general idea about how long it will take for you to recover. But each person recovers at a different pace. Follow the steps below to get better as quickly as possible.    How can you care for yourself at home?   Activity   Rest when you feel tired. Getting enough sleep will help you recover. Try to walk each day. Start by walking a little more than you did the day before. Bit by bit, increase the amount you walk. Walking boosts blood flow and helps prevent pneumonia and constipation.     Avoid lifting anything that would make you strain. This may include heavy grocery bags and milk containers, a heavy briefcase or backpack, cat litter or dog food bags, a vacuum , or a child.      Avoid strenuous activities, such as biking, jogging, weight lifting, or aerobic exercise, until your doctor says it is okay.    You may shower. Pat the cut  Quality 130: Documentation Of Current Medications In The Medical Record: Current Medications Documented Detail Level: Detailed Quality 226: Preventive Care And Screening: Tobacco Use: Screening And Cessation Intervention: Tobacco Screening not Performed

## 2025-06-03 NOTE — ANESTHESIA POSTPROCEDURE EVALUATION
Department of Anesthesiology  Postprocedure Note    Patient: Jamaica Rodriguez  MRN: [id number]  YOB: 1980  Date of evaluation: 6/3/2025    Procedure Summary       Date: 06/03/25 Room / Location: [facility] MAIN OR 07 / [facility] MAIN OR    Anesthesia Start: 1313 Anesthesia Stop: 1558    Procedure: HYSTERECTOMY ABDOMINAL LAPAROSCOPIC ROBOTIC WITH BILATERAL SALPINGECTOMIES/ CYSTGOSCOPY (Abdomen) Diagnosis:       Pelvic pain syndrome      Excessive or frequent menstruation      Intramural leiomyoma of uterus      Iron deficiency anemia secondary to blood loss (chronic)      (Pelvic pain syndrome [R10.2])      (Excessive or frequent menstruation [N92.0])      (Intramural leiomyoma of uterus [D25.1])      (Iron deficiency anemia secondary to blood loss (chronic) [D50.0])    Surgeons: Audrey Thomas MD Responsible Provider: JEFF Ramos MD    Anesthesia Type: general ASA Status: 2            Anesthesia Type: No value filed.    Demetri Phase I: Demetri Score: 10    Demetri Phase II: Demetri Score: 10    Anesthesia Post Evaluation    Patient location during evaluation: PACU  Patient participation: complete - patient participated  Level of consciousness: awake and alert  Airway patency: patent  Nausea: well controlled.  Cardiovascular status: acceptable.  Respiratory status: acceptable  Hydration status: stable  Pain management: adequate    No notable events documented.

## 2025-06-05 ENCOUNTER — HOSPITAL ENCOUNTER (OUTPATIENT)
Dept: PHYSICAL THERAPY | Age: 45
Setting detail: RECURRING SERIES
End: 2025-06-05
Payer: COMMERCIAL

## 2025-06-09 ENCOUNTER — HOSPITAL ENCOUNTER (OUTPATIENT)
Dept: PHYSICAL THERAPY | Age: 45
Setting detail: RECURRING SERIES
End: 2025-06-09
Payer: COMMERCIAL

## 2025-06-12 ENCOUNTER — HOSPITAL ENCOUNTER (OUTPATIENT)
Dept: PHYSICAL THERAPY | Age: 45
Setting detail: RECURRING SERIES
Discharge: HOME | End: 2025-06-15
Payer: COMMERCIAL

## 2025-06-12 PROCEDURE — 97110 THERAPEUTIC EXERCISES: CPT

## 2025-06-12 NOTE — PROGRESS NOTES
range, repetitions, and complexity of movement as indicated.     Date:  5/29 Date:  6/2 Date:  6/12   Activity/Exercise      Education Reassessment     UBE X6min lvl 2 X10min lvl 2.5 X6min lvl 2   Shoulder PROM X10min (flexion/ER) X14min (flexion/ER)    Shoulder AAROM X6min flexion in standing with bar X12min flexion in standing with bar X15min flexion/ER in standing with bar   Wall Walks x4min x4min    Elbow AROM      Pendulums      ER/IR   ER: 3x10 10lbs band  IR: 3x10 10lbs band   Wand exercises      Rows 3x10 blue TB 3x10 15lbs bands 3x10 15lbs bands   Low Rows 3x10 blue TB 3x10 15lbs bands 3x10 15lbs bands   Counter Walkbacks      IR Stretch   X3min w/ stratch   S/L ER      Walking      B ER 3x10 yellow TB  3x10 blue TB       MANUAL THERAPY: (0 minutes):   Joint mobilization, Soft tissue mobilization, and Manipulation was utilized and necessary because of the patient's restricted joint motion, painful spasm, loss of articular motion, and restricted motion of soft tissue.      Date:  5/22 Date:  5/27 Date:  5/29   Activity/Exercise Parameters Parameters    Soft Tissue Pec minor, posterior delt Pec minor, posterior delt Pec minor, posterior delt   Joint Mobilization      Dry Needling            Treatment/Session Summary:    Treatment Assessment: Pt has continues to maintain gains in ROM from previous session, and tolerated additional RTC strengthening exercises well    Communication/Consultation:  None today  Equipment provided today:  HEP and Theraband  Recommendations/Intent for next treatment session: Next visit will focus on progression per protocol.    >Total Treatment Billable Duration:  40 minutes   Time In: 1115  Time Out: 1155     René Lei PT         Charge Capture  Events  [facility] Portal  Appt Desk  Attendance Report     Future Appointments   Date Time Provider Department Center   6/16/2025  1:00 PM René Lei PT [facility]   6/19/2025  1:00 PM René Lei PT [facility]

## 2025-06-16 ENCOUNTER — HOSPITAL ENCOUNTER (OUTPATIENT)
Dept: PHYSICAL THERAPY | Age: 45
Setting detail: RECURRING SERIES
Discharge: HOME | End: 2025-06-19
Payer: COMMERCIAL

## 2025-06-16 PROCEDURE — 97110 THERAPEUTIC EXERCISES: CPT

## 2025-06-16 NOTE — PROGRESS NOTES
Jamaica Rodriguez  : 1980  Primary: Norm Aponte (Worker's Comp)  Secondary:  [place] [address]  [address] [place] [address]  Phone: [phone]  Fax: [phone] Plan Frequency: 2x/week for 90 days    Plan of Care/Certification Expiration Date: 25        Plan of Care/Certification Expiration Date:  Plan of Care/Certification Expiration Date: 25    Frequency/Duration: Plan Frequency: 2x/week for 90 days      Time In/Out:   Time In: 1300  Time Out: 1340      PT Visit Info:         Visit Count:  12    OUTPATIENT PHYSICAL THERAPY:   Treatment Note 2025       Episode  (s/p L Bicep Tenodesis)               Treatment Diagnosis:    Left shoulder pain, unspecified chronicity  Orthopedic aftercare  Labral tear of long head of left biceps tendon, sequela  Medical/Referring Diagnosis:    Left shoulder pain, unspecified chronicity  Superior glenoid labrum lesion of left shoulder, initial encounter  Incomplete tear of left rotator cuff, unspecified whether traumatic      Referring Physician:  Olga Buckley PA MD Orders:  PT Eval and Treat   Return MD Appt:     Date of Onset:  DoS: 25  Allergies:   Patient has no known allergies.  Restrictions/Precautions:   Post Surgical Precautions: Biceps tenodesis protocol      Interventions Planned (Treatment may consist of any combination of the following):     See Assessment Note    Subjective Comments: Patient states she is doing well, shoulder is feeling better    Initial Pain Level:    4  /10  Post Session Pain Level:      3 /10  Medications Last Reviewed: 2025  Updated Objective Findings:  None Today  Treatment   THERAPEUTIC EXERCISE: (40 minutes):    Exercises per grid below to improve mobility, strength, and coordination.  Required minimal visual, verbal, manual, and tactile cues to promote proper body posture and promote proper body mechanics.  Progressed resistance, range, repetitions, and complexity

## 2025-06-23 ENCOUNTER — HOSPITAL ENCOUNTER (OUTPATIENT)
Dept: PHYSICAL THERAPY | Age: 45
Setting detail: RECURRING SERIES
Discharge: HOME | End: 2025-06-26
Payer: COMMERCIAL

## 2025-06-23 PROCEDURE — 97110 THERAPEUTIC EXERCISES: CPT

## 2025-06-23 ASSESSMENT — PAIN SCALES - GENERAL: PAINLEVEL_OUTOF10: 2

## 2025-06-23 NOTE — PROGRESS NOTES
complexity of movement as indicated.     Date:  6/12 Date:  6/16 Date:  6/23   Activity/Exercise      Education      UBE X6min lvl 2 X6min lvl 2 X6min lvl 2   Shoulder PROM      Shoulder AAROM X15min flexion/ER in standing with bar X15min flexion/ER in standing with bar    Wall Walks   x3min   Shoulder Flexion Stretch   x3min   Doorway Stretch   x3min   Overhead Lifts  2x10 6lbs    Bicep Curls  3x10 4lbs 3x10 7lbs   Pronation/ Supination  3x10 4lbs 3x10 7lbs   Pendulums      ER/IR ER: 3x10 10lbs band  IR: 3x10 10lbs band ER: 3x10 10lbs band   ER: 3x10 10lbs band  IR: 3x10 10lbs band   Wand exercises      Rows 3x10 15lbs bands 3x10 15lbs bands 3x10 15lbs bands   Low Rows 3x10 15lbs bands 3x10 15lbs bands 3x10 15lbs bands   Counter Walkbacks      IR Stretch X3min w/ stratch  X3min w/ strap   S/L ER      Walking      B ER 3x10 blue TB  [id number] blue TB       MANUAL THERAPY: (0 minutes):   Joint mobilization, Soft tissue mobilization, and Manipulation was utilized and necessary because of the patient's restricted joint motion, painful spasm, loss of articular motion, and restricted motion of soft tissue.      Date:  5/22 Date:  5/27 Date:  5/29   Activity/Exercise Parameters Parameters    Soft Tissue Pec minor, posterior delt Pec minor, posterior delt Pec minor, posterior delt   Joint Mobilization      Dry Needling            Treatment/Session Summary:    Treatment Assessment: Pt continues to maintain gains in ROM from previous session, strength is continuing to improve. Had some discomfort in the shoulder after exercises    Communication/Consultation:  None today  Equipment provided today:  HEP and Theraband  Recommendations/Intent for next treatment session: Next visit will focus on progression per protocol.    >Total Treatment Billable Duration:  45 minutes   Time In: 1300  Time Out: 1345     René Lei PT         Charge Capture  Events  [employer] Portal  Appt Desk  Attendance Report     Future Appointments

## 2025-06-25 ENCOUNTER — HOSPITAL ENCOUNTER (OUTPATIENT)
Dept: PHYSICAL THERAPY | Age: 45
Setting detail: RECURRING SERIES
Discharge: HOME | End: 2025-06-28
Payer: COMMERCIAL

## 2025-06-25 PROCEDURE — 97110 THERAPEUTIC EXERCISES: CPT

## 2025-06-25 ASSESSMENT — PAIN SCALES - GENERAL: PAINLEVEL_OUTOF10: 2

## 2025-06-25 NOTE — PROGRESS NOTES
Jamaica Rodriguez  : 1980  Primary: Norm Aponte (Worker's Comp)  Secondary:  [place] [address]  [address] [place] [id number]  Phone: [phone]  Fax: [phone] Plan Frequency: 2x/week for 90 days    Plan of Care/Certification Expiration Date: 25        Plan of Care/Certification Expiration Date:  Plan of Care/Certification Expiration Date: 25    Frequency/Duration: Plan Frequency: 2x/week for 90 days      Time In/Out:   Time In: 1301  Time Out: 1344      PT Visit Info:         Visit Count:  14    OUTPATIENT PHYSICAL THERAPY:   Treatment Note 2025       Episode  (s/p L Bicep Tenodesis)               Treatment Diagnosis:    Left shoulder pain, unspecified chronicity  Orthopedic aftercare  Labral tear of long head of left biceps tendon, sequela  Medical/Referring Diagnosis:    Left shoulder pain, unspecified chronicity  Superior glenoid labrum lesion of left shoulder, initial encounter  Incomplete tear of left rotator cuff, unspecified whether traumatic      Referring Physician:  Olga Buckley PA MD Orders:  PT Eval and Treat   Return MD Appt:     Date of Onset:  DoS: 25  Allergies:   Patient has no known allergies.  Restrictions/Precautions:   Post Surgical Precautions: Biceps tenodesis protocol      Interventions Planned (Treatment may consist of any combination of the following):     See Assessment Note    Subjective Comments: Patient states she is feeling good and no reported pain.     Initial Pain Level:     2/10  Post Session Pain Level:      2/10  Medications Last Reviewed: 2025  Updated Objective Findings:  None Today  Treatment   THERAPEUTIC EXERCISE: (43 minutes):    Exercises per grid below to improve mobility, strength, and coordination.  Required minimal visual, verbal, manual, and tactile cues to promote proper body posture and promote proper body mechanics.  Progressed resistance, range, repetitions, and complexity of

## 2025-06-27 NOTE — PROGRESS NOTES
Name: Jamaica Rodriguez  YOB: 1980  Gender: female  MRN: [id number]    Procedure Performed:   1) left Shoulder subpectoral biceps tenodesis   2) left Shoulder Arthroscopy with debridement of rotator cuff, labrum, glenohumeral capsule  3) left Shoulder Arthroscopy with subacromial decompression    Date of Procedure: 4/23/25    Subjective: Returns 9 weeks status post the above procedure. She was struggling with ROM at her last visit.  She is here today for motion check.  She is working [employer].    Physical Examination: Incisions clean dry and intact, no sign of infection. Motor and sensory intact.  pulse 2+.  Today she 140 degrees of active forward flexion.  Passively I can about 170 degrees.  With her arm abducted to 90 degrees and her elbow flexed 90 degrees I can get her about 90 degrees of external rotation passively and 50 degrees of internal rotation passively.  She does have pain with all of these range of motion but she can do it.  I can get her passively to 110 degrees of abduction today with pain.    Assessment:   1. Status post orthopedic surgery, follow-up exam       Plan:   Doing well.  Significant improvement with her range of motion.  Continue PT per Biceps tenodesis protocol.  PT order given to continue with Krunal Lei at [facility].  Follow up in 8 weeks    She was given a work note today stating back to work light duty.  No lifting more than 2 pounds, no repetitive movement and no overhead lifting.    Olga Buckley PA-C  Orthopaedics and Sports Medicine

## 2025-06-30 ENCOUNTER — OFFICE VISIT (OUTPATIENT)
Dept: ORTHOPEDIC SURGERY | Age: 45
End: 2025-06-30

## 2025-06-30 DIAGNOSIS — M75.42 SHOULDER IMPINGEMENT SYNDROME, LEFT: ICD-10-CM

## 2025-06-30 DIAGNOSIS — M75.42 IMPINGEMENT SYNDROME OF LEFT SHOULDER: ICD-10-CM

## 2025-06-30 DIAGNOSIS — M75.22 BICEPS TENDINITIS OF LEFT SHOULDER: ICD-10-CM

## 2025-06-30 DIAGNOSIS — S43.432D SUPERIOR GLENOID LABRUM LESION OF LEFT SHOULDER, SUBSEQUENT ENCOUNTER: ICD-10-CM

## 2025-06-30 DIAGNOSIS — M25.512 LEFT SHOULDER PAIN, UNSPECIFIED CHRONICITY: ICD-10-CM

## 2025-06-30 DIAGNOSIS — Z09 STATUS POST ORTHOPEDIC SURGERY, FOLLOW-UP EXAM: Primary | ICD-10-CM

## 2025-06-30 DIAGNOSIS — M75.112 INCOMPLETE TEAR OF LEFT ROTATOR CUFF, UNSPECIFIED WHETHER TRAUMATIC: ICD-10-CM

## 2025-06-30 DIAGNOSIS — S43.432A SUPERIOR GLENOID LABRUM LESION OF LEFT SHOULDER, INITIAL ENCOUNTER: ICD-10-CM

## 2025-06-30 PROCEDURE — 99024 POSTOP FOLLOW-UP VISIT: CPT | Performed by: PHYSICIAN ASSISTANT

## 2025-06-30 RX ORDER — DICLOFENAC SODIUM 75 MG/1
75 TABLET, DELAYED RELEASE ORAL 2 TIMES DAILY
Qty: 60 TABLET | Refills: 1 | Status: SHIPPED | OUTPATIENT
Start: 2025-06-30

## 2025-07-01 ENCOUNTER — HOSPITAL ENCOUNTER (OUTPATIENT)
Dept: PHYSICAL THERAPY | Age: 45
Setting detail: RECURRING SERIES
End: 2025-07-01
Payer: COMMERCIAL

## 2025-07-07 ENCOUNTER — HOSPITAL ENCOUNTER (OUTPATIENT)
Dept: PHYSICAL THERAPY | Age: 45
Setting detail: RECURRING SERIES
Discharge: HOME | End: 2025-07-10
Payer: COMMERCIAL

## 2025-07-07 PROCEDURE — 97110 THERAPEUTIC EXERCISES: CPT

## 2025-07-07 PROCEDURE — 97140 MANUAL THERAPY 1/> REGIONS: CPT

## 2025-07-07 ASSESSMENT — PAIN SCALES - GENERAL: PAINLEVEL_OUTOF10: 3

## 2025-07-07 NOTE — PROGRESS NOTES
[place]   7/9/2025 11:15 AM René Lei, PT [facility]   7/14/2025  1:45 PM René Lei, PT [facility]   7/16/2025 11:15 AM René Lei, PT [facility]   7/21/2025  1:45 PM René Lei, PT [facility]   7/23/2025 11:15 AM René Lei, PT [facility]   9/8/2025 10:10 AM Hayder Castorena MD [facility]

## 2025-07-09 ENCOUNTER — HOSPITAL ENCOUNTER (OUTPATIENT)
Dept: PHYSICAL THERAPY | Age: 45
Setting detail: RECURRING SERIES
Discharge: HOME | End: 2025-07-12
Payer: COMMERCIAL

## 2025-07-09 PROCEDURE — 97110 THERAPEUTIC EXERCISES: CPT

## 2025-07-09 ASSESSMENT — PAIN SCALES - GENERAL: PAINLEVEL_OUTOF10: 0

## 2025-07-09 NOTE — PROGRESS NOTES
Report     Future Appointments   Date Time Provider Department Center   7/14/2025  1:45 PM René Lei, PT [facility]   7/16/2025 11:15 AM René Lei, PT [facility]   7/21/2025  1:45 PM René Lei, PT [facility]   7/23/2025 11:15 AM René Lei, PT [facility]   9/8/2025 10:10 AM Hayder Castorena MD [facility]

## 2025-07-14 ENCOUNTER — HOSPITAL ENCOUNTER (OUTPATIENT)
Dept: PHYSICAL THERAPY | Age: 45
Setting detail: RECURRING SERIES
Discharge: HOME | End: 2025-07-17
Payer: COMMERCIAL

## 2025-07-14 PROCEDURE — 97110 THERAPEUTIC EXERCISES: CPT

## 2025-07-14 ASSESSMENT — PAIN SCALES - GENERAL: PAINLEVEL_OUTOF10: 0

## 2025-07-14 NOTE — THERAPY RECERTIFICATION
Jamaica Rodriguez  : 1980  Primary: Norm Aponte (Worker's Comp)  Secondary:  [place] [address]  [address] [place] [address]  Phone: [phone]  Fax: [phone] Plan Frequency: 2x/week for 90 days    Plan of Care/Certification Expiration Date: 10/12/25        Plan of Care/Certification Expiration Date:  Plan of Care/Certification Expiration Date: 10/12/25    Frequency/Duration: Plan Frequency: 2x/week for 90 days      Time In/Out:          PT Visit Info:         Visit Count:  17                OUTPATIENT PHYSICAL THERAPY:             Recertification 2025               Episode (s/p L Bicep Tenodesis)         Treatment Diagnosis:     Left shoulder pain, unspecified chronicity  Orthopedic aftercare  Labral tear of long head of left biceps tendon, sequela  Medical/Referring Diagnosis:    Left shoulder pain, unspecified chronicity  Superior glenoid labrum lesion of left shoulder, initial encounter  Incomplete tear of left rotator cuff, unspecified whether traumatic      Referring Physician:  Olga Buckley PA MD Orders:  PT Eval and Treat   Return MD Appt:    Date of Onset:    DoS: 25  Allergies:  Patient has no known allergies.  Restrictions/Precautions:    Post Surgical Precautions: Biceps Tenodesis Protocol      Medications Last Reviewed: 2025     SUBJECTIVE   History of Injury/Illness (Reason for Referral):  Pt was at work and injured her shoulder while trying to catch a falling object. Had a biceps tenodesis and labral debridement on 25. Pts just is a . Job duties include primarily computer work.  Patient Stated Goal(s):  \"Improve my shoulder function\"  Initial Pain Level:    8   /10   Post Session Pain Level:    7  /10  Past Medical History/Comorbidities:   Ms. Rodriguez  has a past medical history of Abnormal Papanicolaou smear of cervix and History of anemia.  Ms. Rodriguez  has a past surgical history that includes

## 2025-07-14 NOTE — PROGRESS NOTES
Jamaica Rodriguez  : 1980  Primary: Norm Aponte (Worker's Comp)  Secondary:  [place] [address]  [address] [place] [address]  Phone: [phone]  Fax: [phone] Plan Frequency: 2x/week for 90 days    Plan of Care/Certification Expiration Date: 10/12/25        Plan of Care/Certification Expiration Date:  Plan of Care/Certification Expiration Date: 10/12/25    Frequency/Duration: Plan Frequency: 2x/week for 90 days      Time In/Out:   Time In: 1345  Time Out: 1425      PT Visit Info:         Visit Count:  17    OUTPATIENT PHYSICAL THERAPY:   Treatment Note 2025       Episode  (s/p L Bicep Tenodesis)               Treatment Diagnosis:    Left shoulder pain, unspecified chronicity  Orthopedic aftercare  Labral tear of long head of left biceps tendon, sequela  Medical/Referring Diagnosis:    Left shoulder pain, unspecified chronicity  Superior glenoid labrum lesion of left shoulder, initial encounter  Incomplete tear of left rotator cuff, unspecified whether traumatic      Referring Physician:  Olga Buckley PA MD Orders:  PT Eval and Treat   Return MD Appt:     Date of Onset:  DoS: 25  Allergies:   Patient has no known allergies.  Restrictions/Precautions:   Post Surgical Precautions: Biceps tenodesis protocol      Interventions Planned (Treatment may consist of any combination of the following):     See Assessment Note    Subjective Comments: Patient states that she feels about 50% better overall. Biggest limitation is going behind her back    Initial Pain Level:     0/10  Post Session Pain Level:      0/10  Medications Last Reviewed: 2025  Updated Objective Findings:  See Recertification Note from today  Treatment   THERAPEUTIC EXERCISE: (40 minutes):    Exercises per grid below to improve mobility, strength, and coordination.  Required minimal visual, verbal, manual, and tactile cues to promote proper body posture and promote proper body

## 2025-07-16 ENCOUNTER — HOSPITAL ENCOUNTER (OUTPATIENT)
Dept: PHYSICAL THERAPY | Age: 45
Setting detail: RECURRING SERIES
Discharge: HOME | End: 2025-07-19
Payer: COMMERCIAL

## 2025-07-16 PROCEDURE — 97110 THERAPEUTIC EXERCISES: CPT

## 2025-07-16 NOTE — PROGRESS NOTES
Jamaica Rodriguez  : 1980  Primary: Norm Aponte (Worker's Comp)  Secondary:  [place] [address]  [address] [place] [address]  Phone: [phone]  Fax: [phone] Plan Frequency: 2x/week for 90 days    Plan of Care/Certification Expiration Date: 10/12/25        Plan of Care/Certification Expiration Date:  Plan of Care/Certification Expiration Date: 10/12/25    Frequency/Duration: Plan Frequency: 2x/week for 90 days      Time In/Out:   Time In: 1115  Time Out: 1200      PT Visit Info:         Visit Count:  18    OUTPATIENT PHYSICAL THERAPY:   Treatment Note 2025       Episode  (s/p L Bicep Tenodesis)               Treatment Diagnosis:    Left shoulder pain, unspecified chronicity  Orthopedic aftercare  Labral tear of long head of left biceps tendon, sequela  Medical/Referring Diagnosis:    Left shoulder pain, unspecified chronicity  Superior glenoid labrum lesion of left shoulder, initial encounter  Incomplete tear of left rotator cuff, unspecified whether traumatic      Referring Physician:  Olga Buckley PA MD Orders:  PT Eval and Treat   Return MD Appt:     Date of Onset:  DoS: 25  Allergies:   Patient has no known allergies.  Restrictions/Precautions:   Post Surgical Precautions: Biceps tenodesis protocol      Interventions Planned (Treatment may consist of any combination of the following):     See Assessment Note    Subjective Comments: Patient states she feels good, can tell she is getting stronger and things are getting easier for her    Initial Pain Level:      /10  Post Session Pain Level:       /10  Medications Last Reviewed: 2025  Updated Objective Findings:  None Today  Treatment   THERAPEUTIC EXERCISE: (45 minutes):    Exercises per grid below to improve mobility, strength, and coordination.  Required minimal visual, verbal, manual, and tactile cues to promote proper body posture and promote proper body mechanics.  Progressed

## 2025-07-21 ENCOUNTER — HOSPITAL ENCOUNTER (OUTPATIENT)
Dept: PHYSICAL THERAPY | Age: 45
Setting detail: RECURRING SERIES
Discharge: HOME | End: 2025-07-24
Payer: COMMERCIAL

## 2025-07-21 PROCEDURE — 97110 THERAPEUTIC EXERCISES: CPT

## 2025-07-21 ASSESSMENT — PAIN SCALES - GENERAL: PAINLEVEL_OUTOF10: 0

## 2025-07-21 NOTE — PROGRESS NOTES
Report     Future Appointments   Date Time Provider Department Center   7/23/2025 11:15 AM René Lei PT [facility]   9/8/2025 10:10 AM Hayder Castorena MD [facility]

## 2025-07-23 ENCOUNTER — HOSPITAL ENCOUNTER (OUTPATIENT)
Dept: PHYSICAL THERAPY | Age: 45
Setting detail: RECURRING SERIES
Discharge: HOME | End: 2025-07-26
Payer: COMMERCIAL

## 2025-07-23 PROCEDURE — 97110 THERAPEUTIC EXERCISES: CPT

## 2025-07-23 ASSESSMENT — PAIN SCALES - GENERAL: PAINLEVEL_OUTOF10: 0

## 2025-07-23 NOTE — PROGRESS NOTES
Jamaica Rodriguez  : 1980  Primary: Norm Aponte (Worker's Comp)  Secondary:  [place] [address]  [address] [place] [address]  Phone: [phone]  Fax: [phone] Plan Frequency: 2x/week for 90 days    Plan of Care/Certification Expiration Date: 10/12/25        Plan of Care/Certification Expiration Date:  Plan of Care/Certification Expiration Date: 10/12/25    Frequency/Duration: Plan Frequency: 2x/week for 90 days      Time In/Out:   Time In: 1117  Time Out: 1203      PT Visit Info:         Visit Count:  20    OUTPATIENT PHYSICAL THERAPY:   Treatment Note 2025       Episode  (s/p L Bicep Tenodesis)               Treatment Diagnosis:    Left shoulder pain, unspecified chronicity  Orthopedic aftercare  Labral tear of long head of left biceps tendon, sequela  Medical/Referring Diagnosis:    Left shoulder pain, unspecified chronicity  Superior glenoid labrum lesion of left shoulder, initial encounter  Incomplete tear of left rotator cuff, unspecified whether traumatic      Referring Physician:  lOga Buckley PA MD Orders:  PT Eval and Treat   Return MD Appt:     Date of Onset:  DoS: 25  Allergies:   Patient has no known allergies.  Restrictions/Precautions:   Post Surgical Precautions: Biceps tenodesis protocol      Interventions Planned (Treatment may consist of any combination of the following):     See Assessment Note    Subjective Comments: Patient states that she is feeling well today.     Initial Pain Level:     0/10  Post Session Pain Level:      0/10  Medications Last Reviewed: 2025  Updated Objective Findings:  None Today  Treatment   THERAPEUTIC EXERCISE: (46 minutes):    Exercises per grid below to improve mobility, strength, and coordination.  Required minimal visual, verbal, manual, and tactile cues to promote proper body posture and promote proper body mechanics.  Progressed resistance, range, repetitions, and complexity of movement as

## 2025-07-30 ENCOUNTER — APPOINTMENT (OUTPATIENT)
Dept: PHYSICAL THERAPY | Age: 45
End: 2025-07-30
Payer: COMMERCIAL

## 2025-08-06 ENCOUNTER — HOSPITAL ENCOUNTER (OUTPATIENT)
Dept: PHYSICAL THERAPY | Age: 45
Setting detail: RECURRING SERIES
Discharge: HOME | End: 2025-08-09
Payer: COMMERCIAL

## 2025-08-06 PROCEDURE — 97110 THERAPEUTIC EXERCISES: CPT

## 2025-08-06 ASSESSMENT — PAIN SCALES - GENERAL: PAINLEVEL_OUTOF10: 0

## 2025-08-08 ENCOUNTER — OFFICE VISIT (OUTPATIENT)
Dept: ORTHOPEDIC SURGERY | Age: 45
End: 2025-08-08

## 2025-08-08 DIAGNOSIS — Z09 STATUS POST ORTHOPEDIC SURGERY, FOLLOW-UP EXAM: Primary | ICD-10-CM

## 2025-08-20 ENCOUNTER — HOSPITAL ENCOUNTER (OUTPATIENT)
Dept: PHYSICAL THERAPY | Age: 45
Setting detail: RECURRING SERIES
Discharge: HOME | End: 2025-08-23
Payer: COMMERCIAL

## 2025-08-20 PROCEDURE — 97110 THERAPEUTIC EXERCISES: CPT

## 2025-08-20 ASSESSMENT — PAIN SCALES - GENERAL: PAINLEVEL_OUTOF10: 0

## (undated) DEVICE — PAD MATERNITY 11IN W/TAILS -- STRL

## (undated) DEVICE — TIP COVER ACCESSORY

## (undated) DEVICE — MANIPULATOR UTER 3CM ULTEM PLAS CUP DELINEATOR

## (undated) DEVICE — DRAPE,UNDERBUTTOCKS,PCH,STERILE: Brand: MEDLINE

## (undated) DEVICE — SYSTEM POS SZ 36 X 20 X 1 IN INTEGR ADJ ARM PROTECTOR LIFT

## (undated) DEVICE — TRAY PREP DRY W/ PREM GLV 2 APPL 6 SPNG 2 UNDPD 1 OVERWRAP

## (undated) DEVICE — DRAPE TWL SURG 16X26IN BLU ORB04] ALLCARE INC]

## (undated) DEVICE — CONTROL SYRINGE LUER-LOCK TIP: Brand: MONOJECT

## (undated) DEVICE — BLADELESS OBTURATOR: Brand: WECK VISTA

## (undated) DEVICE — ARM DRAPE

## (undated) DEVICE — AIRSEAL 5 MM ACCESS PORT AND LOW PROFILE OBTURATOR WITH BLADELESS OPTICAL TIP, 120 MM LENGTH: Brand: AIRSEAL

## (undated) DEVICE — SUTURE ABSORBABLE MONOFILAMENT 0 CT-1 36 MM DYED SPRL PDS + SXPP1B450

## (undated) DEVICE — SOLUTION IRRIG 1000ML H2O STRL BLT

## (undated) DEVICE — LOGICUT SCISSOR LENGTH 320MM: Brand: LOGI - LAPAROSCOPIC INSTRUMENT SYSTEM

## (undated) DEVICE — JELLY LUBRICATING 10GM PREFIL SYR LUBE

## (undated) DEVICE — SYNCHROSEAL: Brand: DA VINCI ENERGY

## (undated) DEVICE — CYSTO/BLADDER IRRIGATION SET, REGULATING CLAMP

## (undated) DEVICE — CARDINAL HEALTH FLEXIBLE LIGHT HANDLE COVER: Brand: CARDINAL HEALTH

## (undated) DEVICE — 1LYRTR 16FR10ML 100%SILI SNAP: Brand: MEDLINE INDUSTRIES, INC.

## (undated) DEVICE — SOLUTION IV 1000ML LAC RINGERS PH 6.5 INJ USP VIAFLX PLAS

## (undated) DEVICE — MINOR LITHOTOMY PACK: Brand: MEDLINE INDUSTRIES, INC.

## (undated) DEVICE — SEAL

## (undated) DEVICE — SOLUTION SCRB 16OZ SKIN 4% CHG CLN BASE W/ PMP FOR PT SKIN

## (undated) DEVICE — GLOVE SURG SZ 65 THK91MIL LTX FREE SYN POLYISOPRENE

## (undated) DEVICE — ROBOTIC HYSTERECTOMY: Brand: MEDLINE INDUSTRIES, INC.

## (undated) DEVICE — CATHETER URETH 16FR PVC 2 W F

## (undated) DEVICE — SYRINGE MED 10ML LUERLOCK TIP W/O SFTY DISP

## (undated) DEVICE — PAD,NON-ADHERENT,3X8,STERILE,LF,1/PK: Brand: MEDLINE

## (undated) DEVICE — ROBOTIC DRAPE WITH LEGGINGS: Brand: CONVERTORS

## (undated) DEVICE — LIQUIBAND RAPID ADHESIVE 36/CS 0.8ML: Brand: MEDLINE

## (undated) DEVICE — GOWN,REINF,POLY,ECL,PP SLV,XL: Brand: MEDLINE

## (undated) DEVICE — HANDPIECE ABLAT DIA6MM ENDOMET IMPED CTRL DEV DISP NOVASURE

## (undated) DEVICE — CANISTER, RIGID, 2000CC: Brand: MEDLINE INDUSTRIES, INC.

## (undated) DEVICE — BARRIER ADH W3XL4IN UTER PELV ABSRB GYNECARE INTCEED

## (undated) DEVICE — APPLICATOR MEDICATED 26 CC SOLUTION HI LT ORNG CHLORAPREP

## (undated) DEVICE — COLUMN DRAPE

## (undated) DEVICE — PUMP SUC IRR TBNG L10FT W/ HNDPC ASSEMB STRYKEFLOW 2

## (undated) DEVICE — SOLUTION IRRIG 1000ML STRL H2O USP PLAS POUR BTL